# Patient Record
Sex: MALE | Race: WHITE | Employment: FULL TIME | URBAN - METROPOLITAN AREA
[De-identification: names, ages, dates, MRNs, and addresses within clinical notes are randomized per-mention and may not be internally consistent; named-entity substitution may affect disease eponyms.]

---

## 2017-01-17 RX ORDER — SITAGLIPTIN 100 MG/1
TABLET, FILM COATED ORAL
Qty: 30 TABLET | Refills: 5 | Status: SHIPPED | OUTPATIENT
Start: 2017-01-17 | End: 2017-06-23 | Stop reason: SDUPTHER

## 2017-02-02 ENCOUNTER — OFFICE VISIT (OUTPATIENT)
Dept: FAMILY MEDICINE CLINIC | Age: 52
End: 2017-02-02

## 2017-02-02 ENCOUNTER — OFFICE VISIT (OUTPATIENT)
Dept: SURGERY | Age: 52
End: 2017-02-02

## 2017-02-02 VITALS
HEIGHT: 72 IN | HEART RATE: 78 BPM | TEMPERATURE: 96.3 F | RESPIRATION RATE: 12 BRPM | WEIGHT: 269.4 LBS | DIASTOLIC BLOOD PRESSURE: 76 MMHG | BODY MASS INDEX: 36.49 KG/M2 | SYSTOLIC BLOOD PRESSURE: 110 MMHG

## 2017-02-02 VITALS
SYSTOLIC BLOOD PRESSURE: 110 MMHG | WEIGHT: 273 LBS | RESPIRATION RATE: 14 BRPM | HEART RATE: 84 BPM | HEIGHT: 74 IN | BODY MASS INDEX: 35.04 KG/M2 | DIASTOLIC BLOOD PRESSURE: 50 MMHG | TEMPERATURE: 98.9 F

## 2017-02-02 DIAGNOSIS — K61.1 PERIRECTAL ABSCESS: Primary | ICD-10-CM

## 2017-02-02 DIAGNOSIS — K61.1 PERI-RECTAL ABSCESS: Primary | ICD-10-CM

## 2017-02-02 PROCEDURE — 96372 THER/PROPH/DIAG INJ SC/IM: CPT | Performed by: FAMILY MEDICINE

## 2017-02-02 PROCEDURE — 99213 OFFICE O/P EST LOW 20 MIN: CPT | Performed by: FAMILY MEDICINE

## 2017-02-02 PROCEDURE — 99204 OFFICE O/P NEW MOD 45 MIN: CPT | Performed by: SURGERY

## 2017-02-02 RX ORDER — METRONIDAZOLE 500 MG/1
500 TABLET ORAL EVERY 6 HOURS
Qty: 40 TABLET | Refills: 0 | Status: SHIPPED | OUTPATIENT
Start: 2017-02-02 | End: 2017-03-14

## 2017-02-02 RX ORDER — SULFAMETHOXAZOLE AND TRIMETHOPRIM 800; 160 MG/1; MG/1
1 TABLET ORAL 2 TIMES DAILY
Qty: 20 TABLET | Refills: 0 | Status: SHIPPED | OUTPATIENT
Start: 2017-02-02 | End: 2017-02-12

## 2017-02-02 RX ORDER — CEFTRIAXONE 1 G/1
1 INJECTION, POWDER, FOR SOLUTION INTRAMUSCULAR; INTRAVENOUS ONCE
Status: COMPLETED | OUTPATIENT
Start: 2017-02-02 | End: 2017-02-02

## 2017-02-02 RX ADMIN — CEFTRIAXONE 1 G: 1 INJECTION, POWDER, FOR SOLUTION INTRAMUSCULAR; INTRAVENOUS at 09:53

## 2017-02-02 ASSESSMENT — ENCOUNTER SYMPTOMS
CHEST TIGHTNESS: 0
VOMITING: 0
ABDOMINAL DISTENTION: 0
COLOR CHANGE: 0
SHORTNESS OF BREATH: 0
EYE PAIN: 0
EYE DISCHARGE: 0
WHEEZING: 0
TROUBLE SWALLOWING: 0
CHOKING: 0
ANAL BLEEDING: 0
BACK PAIN: 0
ABDOMINAL PAIN: 0

## 2017-02-13 ENCOUNTER — OFFICE VISIT (OUTPATIENT)
Dept: SURGERY | Age: 52
End: 2017-02-13

## 2017-02-13 VITALS
WEIGHT: 280 LBS | TEMPERATURE: 98.4 F | SYSTOLIC BLOOD PRESSURE: 140 MMHG | DIASTOLIC BLOOD PRESSURE: 80 MMHG | BODY MASS INDEX: 35.95 KG/M2

## 2017-02-13 DIAGNOSIS — Z12.11 COLON CANCER SCREENING: ICD-10-CM

## 2017-02-13 DIAGNOSIS — K61.1 PERI-RECTAL ABSCESS: Primary | ICD-10-CM

## 2017-02-13 PROCEDURE — 99213 OFFICE O/P EST LOW 20 MIN: CPT | Performed by: SURGERY

## 2017-02-13 ASSESSMENT — ENCOUNTER SYMPTOMS
ABDOMINAL DISTENTION: 0
VOMITING: 0
CHEST TIGHTNESS: 0
WHEEZING: 0
ABDOMINAL PAIN: 0
CHOKING: 0
SHORTNESS OF BREATH: 0
ANAL BLEEDING: 0
BACK PAIN: 0
EYE DISCHARGE: 0
TROUBLE SWALLOWING: 0
EYE PAIN: 0
COLOR CHANGE: 0

## 2017-02-16 RX ORDER — SODIUM, POTASSIUM,MAG SULFATES 17.5-3.13G
354 SOLUTION, RECONSTITUTED, ORAL ORAL SEE ADMIN INSTRUCTIONS
Qty: 1 BOTTLE | Refills: 0 | Status: SHIPPED | OUTPATIENT
Start: 2017-02-16 | End: 2017-03-20 | Stop reason: ALTCHOICE

## 2017-03-01 RX ORDER — SODIUM, POTASSIUM,MAG SULFATES 17.5-3.13G
354 SOLUTION, RECONSTITUTED, ORAL ORAL SEE ADMIN INSTRUCTIONS
Qty: 1 BOTTLE | Refills: 0 | Status: SHIPPED | OUTPATIENT
Start: 2017-03-01 | End: 2017-03-20 | Stop reason: ALTCHOICE

## 2017-03-20 ENCOUNTER — OFFICE VISIT (OUTPATIENT)
Dept: FAMILY MEDICINE CLINIC | Age: 52
End: 2017-03-20

## 2017-03-20 VITALS
BODY MASS INDEX: 35.96 KG/M2 | HEART RATE: 66 BPM | WEIGHT: 280.2 LBS | DIASTOLIC BLOOD PRESSURE: 84 MMHG | HEIGHT: 74 IN | SYSTOLIC BLOOD PRESSURE: 130 MMHG | TEMPERATURE: 96.4 F | RESPIRATION RATE: 12 BRPM

## 2017-03-20 DIAGNOSIS — I10 ESSENTIAL HYPERTENSION: ICD-10-CM

## 2017-03-20 DIAGNOSIS — E11.21 DIABETES MELLITUS WITH MICROALBUMINURIC DIABETIC NEPHROPATHY (HCC): ICD-10-CM

## 2017-03-20 DIAGNOSIS — E11.9 TYPE 2 DIABETES MELLITUS WITHOUT COMPLICATION, WITHOUT LONG-TERM CURRENT USE OF INSULIN (HCC): ICD-10-CM

## 2017-03-20 DIAGNOSIS — E66.01 MORBID OBESITY DUE TO EXCESS CALORIES (HCC): ICD-10-CM

## 2017-03-20 DIAGNOSIS — Z12.5 SCREENING PSA (PROSTATE SPECIFIC ANTIGEN): ICD-10-CM

## 2017-03-20 DIAGNOSIS — E78.1 HYPERTRIGLYCERIDEMIA: ICD-10-CM

## 2017-03-20 DIAGNOSIS — I10 ESSENTIAL HYPERTENSION: Primary | ICD-10-CM

## 2017-03-20 DIAGNOSIS — S46.912A LEFT SHOULDER STRAIN, INITIAL ENCOUNTER: ICD-10-CM

## 2017-03-20 LAB
ALBUMIN SERPL-MCNC: 4.4 G/DL (ref 3.9–4.9)
ALP BLD-CCNC: 97 U/L (ref 35–104)
ALT SERPL-CCNC: 23 U/L (ref 0–41)
ANION GAP SERPL CALCULATED.3IONS-SCNC: 13 MEQ/L (ref 7–13)
AST SERPL-CCNC: 15 U/L (ref 0–40)
BASOPHILS ABSOLUTE: 0.1 K/UL (ref 0–0.2)
BASOPHILS RELATIVE PERCENT: 0.4 %
BILIRUB SERPL-MCNC: 0.3 MG/DL (ref 0–1.2)
BUN BLDV-MCNC: 14 MG/DL (ref 6–20)
CALCIUM SERPL-MCNC: 9.9 MG/DL (ref 8.6–10.2)
CHLORIDE BLD-SCNC: 99 MEQ/L (ref 98–107)
CHOLESTEROL, TOTAL: 174 MG/DL (ref 0–199)
CO2: 24 MEQ/L (ref 22–29)
CREAT SERPL-MCNC: 0.68 MG/DL (ref 0.7–1.2)
EOSINOPHILS ABSOLUTE: 0.4 K/UL (ref 0–0.7)
EOSINOPHILS RELATIVE PERCENT: 3 %
GFR AFRICAN AMERICAN: >60
GFR NON-AFRICAN AMERICAN: >60
GLOBULIN: 2.6 G/DL (ref 2.3–3.5)
GLUCOSE BLD-MCNC: 146 MG/DL (ref 74–109)
HBA1C MFR BLD: 7.1 % (ref 4.8–5.9)
HCT VFR BLD CALC: 46.9 % (ref 42–52)
HDLC SERPL-MCNC: 34 MG/DL (ref 40–59)
HEMOGLOBIN: 15.8 G/DL (ref 14–18)
LDL CHOLESTEROL CALCULATED: ABNORMAL MG/DL (ref 0–129)
LYMPHOCYTES ABSOLUTE: 3 K/UL (ref 1–4.8)
LYMPHOCYTES RELATIVE PERCENT: 23.5 %
MCH RBC QN AUTO: 30.3 PG (ref 27–31.3)
MCHC RBC AUTO-ENTMCNC: 33.7 % (ref 33–37)
MCV RBC AUTO: 89.9 FL (ref 80–100)
MONOCYTES ABSOLUTE: 0.8 K/UL (ref 0.2–0.8)
MONOCYTES RELATIVE PERCENT: 6.5 %
NEUTROPHILS ABSOLUTE: 8.5 K/UL (ref 1.4–6.5)
NEUTROPHILS RELATIVE PERCENT: 66.6 %
PDW BLD-RTO: 14.5 % (ref 11.5–14.5)
PLATELET # BLD: 270 K/UL (ref 130–400)
POTASSIUM SERPL-SCNC: 4.8 MEQ/L (ref 3.5–5.1)
PROSTATE SPECIFIC ANTIGEN: 0.31 NG/ML (ref 0–3.89)
RBC # BLD: 5.22 M/UL (ref 4.7–6.1)
SODIUM BLD-SCNC: 136 MEQ/L (ref 132–144)
TOTAL PROTEIN: 7 G/DL (ref 6.4–8.1)
TRIGL SERPL-MCNC: 414 MG/DL (ref 0–200)
WBC # BLD: 12.8 K/UL (ref 4.8–10.8)

## 2017-03-20 PROCEDURE — 99214 OFFICE O/P EST MOD 30 MIN: CPT | Performed by: FAMILY MEDICINE

## 2017-03-20 ASSESSMENT — PATIENT HEALTH QUESTIONNAIRE - PHQ9
SUM OF ALL RESPONSES TO PHQ9 QUESTIONS 1 & 2: 1
SUM OF ALL RESPONSES TO PHQ QUESTIONS 1-9: 1
1. LITTLE INTEREST OR PLEASURE IN DOING THINGS: 0
2. FEELING DOWN, DEPRESSED OR HOPELESS: 1

## 2017-04-28 PROBLEM — K64.4 EXTERNAL HEMORRHOID: Status: ACTIVE | Noted: 2017-04-28

## 2017-05-10 RX ORDER — COLCHICINE 0.6 MG/1
TABLET ORAL
Qty: 60 TABLET | Refills: 2 | Status: SHIPPED | OUTPATIENT
Start: 2017-05-10 | End: 2018-07-30 | Stop reason: SDUPTHER

## 2017-06-23 ENCOUNTER — OFFICE VISIT (OUTPATIENT)
Dept: FAMILY MEDICINE CLINIC | Age: 52
End: 2017-06-23

## 2017-06-23 VITALS
BODY MASS INDEX: 34.93 KG/M2 | TEMPERATURE: 96.5 F | WEIGHT: 272.2 LBS | SYSTOLIC BLOOD PRESSURE: 112 MMHG | RESPIRATION RATE: 18 BRPM | HEART RATE: 66 BPM | DIASTOLIC BLOOD PRESSURE: 66 MMHG | HEIGHT: 74 IN

## 2017-06-23 DIAGNOSIS — E78.1 HYPERTRIGLYCERIDEMIA: ICD-10-CM

## 2017-06-23 DIAGNOSIS — I10 ESSENTIAL HYPERTENSION: ICD-10-CM

## 2017-06-23 DIAGNOSIS — Z00.8 ENCOUNTER FOR PSYCHOLOGICAL EVALUATION: ICD-10-CM

## 2017-06-23 DIAGNOSIS — E11.21 DIABETES MELLITUS WITH MICROALBUMINURIC DIABETIC NEPHROPATHY (HCC): ICD-10-CM

## 2017-06-23 DIAGNOSIS — L23.7 POISON IVY DERMATITIS: ICD-10-CM

## 2017-06-23 DIAGNOSIS — E66.01 MORBID OBESITY DUE TO EXCESS CALORIES (HCC): ICD-10-CM

## 2017-06-23 DIAGNOSIS — M1A.9XX0 CHRONIC GOUT WITHOUT TOPHUS, UNSPECIFIED CAUSE, UNSPECIFIED SITE: ICD-10-CM

## 2017-06-23 DIAGNOSIS — E11.9 TYPE 2 DIABETES MELLITUS WITHOUT COMPLICATION, WITHOUT LONG-TERM CURRENT USE OF INSULIN (HCC): ICD-10-CM

## 2017-06-23 DIAGNOSIS — E11.21 DIABETES MELLITUS WITH MICROALBUMINURIC DIABETIC NEPHROPATHY (HCC): Primary | ICD-10-CM

## 2017-06-23 LAB
HBA1C MFR BLD: 7.5 % (ref 4.8–5.9)
URIC ACID, SERUM: 7.9 MG/DL (ref 3.4–7)

## 2017-06-23 PROCEDURE — 99214 OFFICE O/P EST MOD 30 MIN: CPT | Performed by: FAMILY MEDICINE

## 2017-06-23 RX ORDER — FEBUXOSTAT 40 MG/1
40 TABLET, FILM COATED ORAL DAILY
Qty: 30 TABLET | Refills: 11 | Status: SHIPPED | OUTPATIENT
Start: 2017-06-23 | End: 2017-06-24 | Stop reason: SDUPTHER

## 2017-06-24 RX ORDER — FEBUXOSTAT 80 MG/1
80 TABLET, FILM COATED ORAL DAILY
Qty: 30 TABLET | Refills: 11 | Status: SHIPPED | OUTPATIENT
Start: 2017-06-24 | End: 2018-01-12 | Stop reason: ALTCHOICE

## 2017-07-03 DIAGNOSIS — E11.9 TYPE 2 DIABETES MELLITUS WITHOUT COMPLICATION, UNSPECIFIED LONG TERM INSULIN USE STATUS: ICD-10-CM

## 2017-09-20 LAB — PATHOLOGY REPORT: NORMAL

## 2017-09-22 ENCOUNTER — HOSPITAL ENCOUNTER (OUTPATIENT)
Dept: CT IMAGING | Age: 52
Discharge: HOME | End: 2017-09-22
Payer: COMMERCIAL

## 2017-09-22 VITALS
RESPIRATION RATE: 16 BRPM | WEIGHT: 275 LBS | HEIGHT: 74 IN | DIASTOLIC BLOOD PRESSURE: 75 MMHG | BODY MASS INDEX: 35.29 KG/M2 | SYSTOLIC BLOOD PRESSURE: 138 MMHG | HEART RATE: 54 BPM

## 2017-09-22 DIAGNOSIS — M51.26 RUPTURED LUMBAR DISC: ICD-10-CM

## 2017-09-22 DIAGNOSIS — M96.1 POSTLAMINECTOMY SYNDROME, UNSPECIFIED REGION: ICD-10-CM

## 2017-09-22 PROCEDURE — 72131 CT LUMBAR SPINE W/O DYE: CPT

## 2017-10-17 DIAGNOSIS — I10 ESSENTIAL HYPERTENSION: ICD-10-CM

## 2017-10-17 RX ORDER — LISINOPRIL 10 MG/1
TABLET ORAL
Qty: 90 TABLET | Refills: 3 | Status: SHIPPED | OUTPATIENT
Start: 2017-10-17 | End: 2018-01-12 | Stop reason: SDUPTHER

## 2017-10-17 NOTE — TELEPHONE ENCOUNTER
Pharmacy REQUESTING REFILL. ORDER PENDED.  [address]    LOV-6/23/2017    Future Appointments  Date Time Provider [place]   11/17/2017 1:30 PM Justin Klein  [address]   12/22/2017 8:45 AM MD Cathryn Burciaga PCP [facility] [place]

## 2018-01-12 ENCOUNTER — OFFICE VISIT (OUTPATIENT)
Dept: FAMILY MEDICINE CLINIC | Age: 53
End: 2018-01-12

## 2018-01-12 ENCOUNTER — TELEPHONE (OUTPATIENT)
Dept: FAMILY MEDICINE CLINIC | Age: 53
End: 2018-01-12

## 2018-01-12 VITALS
WEIGHT: 280.4 LBS | HEART RATE: 81 BPM | SYSTOLIC BLOOD PRESSURE: 134 MMHG | TEMPERATURE: 98 F | OXYGEN SATURATION: 98 % | BODY MASS INDEX: 35.99 KG/M2 | DIASTOLIC BLOOD PRESSURE: 88 MMHG | HEIGHT: 74 IN

## 2018-01-12 DIAGNOSIS — I10 ESSENTIAL HYPERTENSION: ICD-10-CM

## 2018-01-12 DIAGNOSIS — E11.9 TYPE 2 DIABETES MELLITUS WITHOUT COMPLICATION, UNSPECIFIED LONG TERM INSULIN USE STATUS: Primary | ICD-10-CM

## 2018-01-12 DIAGNOSIS — E66.01 OBESITY, MORBID (HCC): ICD-10-CM

## 2018-01-12 PROCEDURE — 99214 OFFICE O/P EST MOD 30 MIN: CPT | Performed by: FAMILY MEDICINE

## 2018-01-12 RX ORDER — LISINOPRIL 10 MG/1
TABLET ORAL
Qty: 30 TABLET | Refills: 11 | Status: SHIPPED | OUTPATIENT
Start: 2018-01-12 | End: 2018-01-12 | Stop reason: SDUPTHER

## 2018-01-12 RX ORDER — LISINOPRIL 20 MG/1
TABLET ORAL
Qty: 30 TABLET | Refills: 11 | Status: SHIPPED | OUTPATIENT
Start: 2018-01-12 | End: 2018-12-16 | Stop reason: SDUPTHER

## 2018-01-12 RX ORDER — FEBUXOSTAT 80 MG/1
80 TABLET, FILM COATED ORAL DAILY
Qty: 30 TABLET | Refills: 11 | Status: SHIPPED
Start: 2018-01-12

## 2018-01-12 NOTE — PROGRESS NOTES
Lymphatic ROS: negative  Respiratory ROS: no cough, shortness of breath, or wheezing  Cardiovascular ROS: no chest pain or dyspnea on exertion  Gastrointestinal ROS: no abdominal pain, change in bowel habits, or black or bloody stools  Genito-Urinary ROS: no dysuria, trouble voiding, or hematuria  Musculoskeletal ROS: positive for - pain in generalized back  Neurological ROS: positive for - numbness/tingling  Dermatological ROS: negative    Vitals:    01/12/18 0855   BP: 134/88   Site: Left Arm   Position: Sitting   Cuff Size: Medium Adult   Pulse: 81   Temp: 98 °F (36.7 °C)   TempSrc: Temporal   SpO2: 98%   Weight: 280 lb 6.4 oz (127.2 kg)   Height: 6' 2\" (1.88 m)     BP on recheck 142/92. Physical Examination: General appearance - alert, well appearing, obese and in no distress. Skin - normal coloration and turgor, no rashes, no suspicious skin lesions noted. Ears - bilateral TM's and external ear canals normal  Nose - normal and patent, no erythema, discharge or polyps and mucosal congestion  Mouth - mucous membranes moist, pharynx normal without lesions  Neck - supple, no significant adenopathy, carotids upstroke normal bilaterally, no bruits, thyroid exam: thyroid is normal in size without nodules or tenderness  Chest - clear to auscultation, no wheezes, rales or rhonchi, symmetric air entry  Heart - normal rate, regular rhythm, normal S1, S2, no murmurs, rubs, clicks or gallops. Abdomen - soft, nondistended, nontender. No masses or organomegaly noted. Bowel sounds are positive. Extremity - no edema. Dorsalis pedis and posterior tibial pulses are symmetric. Assessment:   1. Type 2 diabetes mellitus without complication, unspecified long term insulin use status (HCC)  metFORMIN (GLUCOPHAGE) 500 MG tablet   2. Essential hypertension  lisinopril (PRINIVIL;ZESTRIL) 20 MG tablet    DISCONTINUED: lisinopril (PRINIVIL;ZESTRIL) 10 MG tablet   3.  Obesity, morbid ([address]       Plan:    Orders Placed This

## 2018-07-30 ENCOUNTER — OFFICE VISIT (OUTPATIENT)
Dept: FAMILY MEDICINE CLINIC | Age: 53
End: 2018-07-30
Payer: COMMERCIAL

## 2018-07-30 VITALS
WEIGHT: 264.2 LBS | TEMPERATURE: 98.3 F | SYSTOLIC BLOOD PRESSURE: 118 MMHG | BODY MASS INDEX: 35.02 KG/M2 | HEART RATE: 80 BPM | OXYGEN SATURATION: 96 % | HEIGHT: 73 IN | DIASTOLIC BLOOD PRESSURE: 62 MMHG

## 2018-07-30 DIAGNOSIS — E66.01 OBESITY, MORBID (HCC): ICD-10-CM

## 2018-07-30 DIAGNOSIS — E78.1 HYPERTRIGLYCERIDEMIA: ICD-10-CM

## 2018-07-30 DIAGNOSIS — I10 ESSENTIAL HYPERTENSION: ICD-10-CM

## 2018-07-30 DIAGNOSIS — Z12.5 SCREENING PSA (PROSTATE SPECIFIC ANTIGEN): ICD-10-CM

## 2018-07-30 DIAGNOSIS — E11.9 TYPE 2 DIABETES MELLITUS WITHOUT COMPLICATION, UNSPECIFIED LONG TERM INSULIN USE STATUS: Primary | ICD-10-CM

## 2018-07-30 DIAGNOSIS — Z13.31 POSITIVE DEPRESSION SCREENING: ICD-10-CM

## 2018-07-30 PROCEDURE — G8417 CALC BMI ABV UP PARAM F/U: HCPCS | Performed by: FAMILY MEDICINE

## 2018-07-30 PROCEDURE — 2022F DILAT RTA XM EVC RTNOPTHY: CPT | Performed by: FAMILY MEDICINE

## 2018-07-30 PROCEDURE — 4004F PT TOBACCO SCREEN RCVD TLK: CPT | Performed by: FAMILY MEDICINE

## 2018-07-30 PROCEDURE — 3017F COLORECTAL CA SCREEN DOC REV: CPT | Performed by: FAMILY MEDICINE

## 2018-07-30 PROCEDURE — 99214 OFFICE O/P EST MOD 30 MIN: CPT | Performed by: FAMILY MEDICINE

## 2018-07-30 PROCEDURE — G8431 POS CLIN DEPRES SCRN F/U DOC: HCPCS | Performed by: FAMILY MEDICINE

## 2018-07-30 PROCEDURE — G8427 DOCREV CUR MEDS BY ELIG CLIN: HCPCS | Performed by: FAMILY MEDICINE

## 2018-07-30 PROCEDURE — 96160 PT-FOCUSED HLTH RISK ASSMT: CPT | Performed by: FAMILY MEDICINE

## 2018-07-30 PROCEDURE — 3046F HEMOGLOBIN A1C LEVEL >9.0%: CPT | Performed by: FAMILY MEDICINE

## 2018-07-30 RX ORDER — LANCETS 28 GAUGE
1 EACH MISCELLANEOUS DAILY
Qty: 100 EACH | Refills: 3 | Status: SHIPPED | OUTPATIENT
Start: 2018-07-30

## 2018-07-30 RX ORDER — COLCHICINE 0.6 MG/1
TABLET ORAL
Qty: 60 TABLET | Refills: 2 | Status: SHIPPED | OUTPATIENT
Start: 2018-07-30 | End: 2018-11-12 | Stop reason: SDUPTHER

## 2018-07-30 ASSESSMENT — PATIENT HEALTH QUESTIONNAIRE - PHQ9
2. FEELING DOWN, DEPRESSED OR HOPELESS: 2
10. IF YOU CHECKED OFF ANY PROBLEMS, HOW DIFFICULT HAVE THESE PROBLEMS MADE IT FOR YOU TO DO YOUR WORK, TAKE CARE OF THINGS AT HOME, OR GET ALONG WITH OTHER PEOPLE: 2
9. THOUGHTS THAT YOU WOULD BE BETTER OFF DEAD, OR OF HURTING YOURSELF: 0
5. POOR APPETITE OR OVEREATING: 1
6. FEELING BAD ABOUT YOURSELF - OR THAT YOU ARE A FAILURE OR HAVE LET YOURSELF OR YOUR FAMILY DOWN: 0
SUM OF ALL RESPONSES TO PHQ9 QUESTIONS 1 & 2: 4
4. FEELING TIRED OR HAVING LITTLE ENERGY: 1
7. TROUBLE CONCENTRATING ON THINGS, SUCH AS READING THE NEWSPAPER OR WATCHING TELEVISION: 0
1. LITTLE INTEREST OR PLEASURE IN DOING THINGS: 2
SUM OF ALL RESPONSES TO PHQ QUESTIONS 1-9: 8
8. MOVING OR SPEAKING SO SLOWLY THAT OTHER PEOPLE COULD HAVE NOTICED. OR THE OPPOSITE, BEING SO FIGETY OR RESTLESS THAT YOU HAVE BEEN MOVING AROUND A LOT MORE THAN USUAL: 0
3. TROUBLE FALLING OR STAYING ASLEEP: 2

## 2018-07-30 NOTE — PROGRESS NOTES
Chief Complaint   Patient presents with    Follow-up     LOV 01/12/2018    Diabetes Mellitus     Type 2    Hypertension    Obesity    Discuss Medications     HPI: Bennett Ruffin is a 46 y.o. male presenting for follow-up of DM Type 2, HTN, and Obesity. I last saw the patient 01/12/2018. He is working and has Sunday and Monday off. He is not having a good experience there. His blood sugar was 120 yesterday. He was off of his medication for a period of time at his own discretion. However, when his blood sugars did elevate into the upper 200/300 range, he went back on his medications. Past Medical History:   Diagnosis Date    Gout     Hypertension     Hypertriglyceridemia     Obesity, morbid ([address]     Obstructive sleep apnea      Past Surgical History:   Procedure Laterality Date    BACK SURGERY      LUMBAR 10/2009    SPINE SURGERY  4-12-12    neurospine stimulator    TONSILLECTOMY AND ADENOIDECTOMY      1969       family history includes Cancer in his paternal grandfather; Heart Disease in his maternal grandmother and mother; High Blood Pressure in his maternal grandmother. Social History     Social History    Marital status:      Spouse name: N/A    Number of children: N/A    Years of education: N/A     Occupational History    Not on file.      Social History Main Topics    Smoking status: Current Every Day Smoker     Packs/day: 1.50     Years: 32.00     Types: Cigarettes    Smokeless tobacco: Never Used    Alcohol use Yes      Comment: OCCASIONAL     Drug use: No    Sexual activity: Not on file     Other Topics Concern    Not on file     Social History Narrative    No narrative on file       Allergies   Allergen Reactions    Demerol Rash and Other (See Comments)     FEVER    Demerol Hcl [Meperidine] Rash     Review of Systems - General ROS: positive for  - fatigue  Psychological ROS: negative  ENT ROS: negative  Hematological and Lymphatic ROS: negative  Respiratory ROS: no cough, shortness of breath, or wheezing  Cardiovascular ROS: no chest pain or dyspnea on exertion  Gastrointestinal ROS: no abdominal pain, change in bowel habits, or black or bloody stools  Genito-Urinary ROS: no dysuria, trouble voiding, or hematuria  Musculoskeletal ROS: negative  Neurological ROS: tingling/numbness, dizziness  Dermatological ROS: rash on left foot    Vitals:    07/30/18 1331 07/30/18 1402   BP: 106/70 118/62   Site: Left Arm    Position: Sitting    Cuff Size: Medium Adult    Pulse: 80    Temp: 98.3 °F (36.8 °C)    TempSrc: Temporal    SpO2: 96%    Weight: 264 lb 3.2 oz (119.8 kg)    Height: 6' 0.87\" (1.851 m)      Physical Examination: General appearance - alert, well appearing, obese and in no distress. Skin - normal coloration and turgor, no rashes, no suspicious skin lesions noted. Ears - bilateral TM's and external ear canals normal  Nose - normal and patent, no erythema, discharge or polyps and mucosal congestion  Mouth - mucous membranes moist, pharynx normal without lesions  Neck - supple, no significant adenopathy, carotids upstroke normal bilaterally, no bruits, thyroid exam: thyroid is normal in size without nodules or tenderness  Chest - clear to auscultation, no wheezes, rales or rhonchi, symmetric air entry  Heart - normal rate, regular rhythm, normal S1, S2, no murmurs, rubs, clicks or gallops. Abdomen - soft, nondistended, nontender. No masses or organomegaly noted. Bowel sounds are positive. Extremity - no edema. Dorsalis pedis and posterior tibial pulses are symmetric. No fissures between the toes. No open sores on the feet and no foot deformities noted. Mild sensory deficits are detected. Monofilament testing is abnormal.  Left foot is worse than the right foot. Assessment:    Diagnosis Orders   1.  Type 2 diabetes mellitus without complication, unspecified long term insulin use status (HCC)  metFORMIN (GLUCOPHAGE) 500 MG tablet    Hemoglobin A1C    [facility] DIABETES FOOT

## 2018-07-31 ENCOUNTER — TELEPHONE (OUTPATIENT)
Dept: FAMILY MEDICINE CLINIC | Age: 53
End: 2018-07-31

## 2018-07-31 RX ORDER — ALOGLIPTIN 25 MG/1
25 TABLET, FILM COATED ORAL DAILY
Qty: 30 TABLET | Refills: 11 | Status: SHIPPED | OUTPATIENT
Start: 2018-07-31

## 2018-08-13 DIAGNOSIS — Z12.5 SCREENING PSA (PROSTATE SPECIFIC ANTIGEN): ICD-10-CM

## 2018-08-13 DIAGNOSIS — I10 ESSENTIAL HYPERTENSION: ICD-10-CM

## 2018-08-13 DIAGNOSIS — E78.1 HYPERTRIGLYCERIDEMIA: ICD-10-CM

## 2018-08-13 LAB
ALBUMIN SERPL-MCNC: 4.4 G/DL (ref 3.9–4.9)
ALP BLD-CCNC: 72 U/L (ref 35–104)
ALT SERPL-CCNC: 19 U/L (ref 0–41)
ANION GAP SERPL CALCULATED.3IONS-SCNC: 16 MEQ/L (ref 7–13)
AST SERPL-CCNC: 19 U/L (ref 0–40)
BASOPHILS ABSOLUTE: 0.1 K/UL (ref 0–0.2)
BASOPHILS RELATIVE PERCENT: 0.5 %
BILIRUB SERPL-MCNC: <0.2 MG/DL (ref 0–1.2)
BUN BLDV-MCNC: 15 MG/DL (ref 6–20)
CALCIUM SERPL-MCNC: 9.3 MG/DL (ref 8.6–10.2)
CHLORIDE BLD-SCNC: 102 MEQ/L (ref 98–107)
CHOLESTEROL, TOTAL: 218 MG/DL (ref 0–199)
CO2: 22 MEQ/L (ref 22–29)
CREAT SERPL-MCNC: 0.68 MG/DL (ref 0.7–1.2)
EOSINOPHILS ABSOLUTE: 0.3 K/UL (ref 0–0.7)
EOSINOPHILS RELATIVE PERCENT: 2.6 %
GFR AFRICAN AMERICAN: >60
GFR NON-AFRICAN AMERICAN: >60
GLOBULIN: 2.7 G/DL (ref 2.3–3.5)
GLUCOSE BLD-MCNC: 102 MG/DL (ref 74–109)
HCT VFR BLD CALC: 46.5 % (ref 42–52)
HDLC SERPL-MCNC: 31 MG/DL (ref 40–59)
HEMOGLOBIN: 15.6 G/DL (ref 14–18)
LDL CHOLESTEROL CALCULATED: 144 MG/DL (ref 0–129)
LYMPHOCYTES ABSOLUTE: 2.2 K/UL (ref 1–4.8)
LYMPHOCYTES RELATIVE PERCENT: 20.7 %
MCH RBC QN AUTO: 30.8 PG (ref 27–31.3)
MCHC RBC AUTO-ENTMCNC: 33.6 % (ref 33–37)
MCV RBC AUTO: 91.7 FL (ref 80–100)
MONOCYTES ABSOLUTE: 0.7 K/UL (ref 0.2–0.8)
MONOCYTES RELATIVE PERCENT: 6.1 %
NEUTROPHILS ABSOLUTE: 7.5 K/UL (ref 1.4–6.5)
NEUTROPHILS RELATIVE PERCENT: 70.1 %
PDW BLD-RTO: 14.4 % (ref 11.5–14.5)
PLATELET # BLD: 285 K/UL (ref 130–400)
POTASSIUM SERPL-SCNC: 4.9 MEQ/L (ref 3.5–5.1)
PROSTATE SPECIFIC ANTIGEN: 0.39 NG/ML (ref 0–3.89)
RBC # BLD: 5.07 M/UL (ref 4.7–6.1)
SODIUM BLD-SCNC: 140 MEQ/L (ref 132–144)
TOTAL PROTEIN: 7.1 G/DL (ref 6.4–8.1)
TRIGL SERPL-MCNC: 216 MG/DL (ref 0–200)
WBC # BLD: 10.7 K/UL (ref 4.8–10.8)

## 2018-08-14 ENCOUNTER — TELEPHONE (OUTPATIENT)
Dept: FAMILY MEDICINE CLINIC | Age: 53
End: 2018-08-14

## 2018-08-14 DIAGNOSIS — E78.1 HYPERTRIGLYCERIDEMIA: Primary | ICD-10-CM

## 2018-08-14 RX ORDER — ATORVASTATIN CALCIUM 20 MG/1
TABLET, FILM COATED ORAL
Qty: 30 TABLET | Refills: 11 | Status: SHIPPED | OUTPATIENT
Start: 2018-08-14 | End: 2018-10-26 | Stop reason: SDUPTHER

## 2018-08-14 NOTE — TELEPHONE ENCOUNTER
Yes, patient should be taking atorvastatin 20 mg by mouth daily at bedtime. Prescription was sent to pharmacy of record.

## 2018-08-14 NOTE — TELEPHONE ENCOUNTER
The pharmacy called in regards to the patients atorvastatin. The patient is unsure if he is to be on this, and the pharmacy is unsure as well. It is listed in the patients chart but has not been ordered since 2016 (with 11 refills). I did not see anything about it in his OV notes. Is patient to be on this? If so, the pharmacy will need a new prescription. Please advise.

## 2018-10-26 ENCOUNTER — OFFICE VISIT (OUTPATIENT)
Dept: FAMILY MEDICINE CLINIC | Age: 53
End: 2018-10-26
Payer: COMMERCIAL

## 2018-10-26 VITALS
HEART RATE: 66 BPM | TEMPERATURE: 97.3 F | BODY MASS INDEX: 33.75 KG/M2 | SYSTOLIC BLOOD PRESSURE: 120 MMHG | HEIGHT: 74 IN | WEIGHT: 263 LBS | RESPIRATION RATE: 20 BRPM | DIASTOLIC BLOOD PRESSURE: 68 MMHG

## 2018-10-26 DIAGNOSIS — E78.1 HYPERTRIGLYCERIDEMIA: ICD-10-CM

## 2018-10-26 DIAGNOSIS — I10 ESSENTIAL HYPERTENSION: ICD-10-CM

## 2018-10-26 DIAGNOSIS — Z23 NEEDS FLU SHOT: ICD-10-CM

## 2018-10-26 DIAGNOSIS — M25.461 EFFUSION OF BURSA OF RIGHT KNEE: ICD-10-CM

## 2018-10-26 DIAGNOSIS — E66.01 OBESITY, MORBID (HCC): ICD-10-CM

## 2018-10-26 DIAGNOSIS — M25.461 EFFUSION OF BURSA OF RIGHT KNEE: Primary | ICD-10-CM

## 2018-10-26 DIAGNOSIS — E11.9 TYPE 2 DIABETES MELLITUS WITHOUT COMPLICATION, WITHOUT LONG-TERM CURRENT USE OF INSULIN (HCC): ICD-10-CM

## 2018-10-26 PROCEDURE — 2022F DILAT RTA XM EVC RTNOPTHY: CPT | Performed by: FAMILY MEDICINE

## 2018-10-26 PROCEDURE — 99214 OFFICE O/P EST MOD 30 MIN: CPT | Performed by: FAMILY MEDICINE

## 2018-10-26 PROCEDURE — 4004F PT TOBACCO SCREEN RCVD TLK: CPT | Performed by: FAMILY MEDICINE

## 2018-10-26 PROCEDURE — 20610 DRAIN/INJ JOINT/BURSA W/O US: CPT | Performed by: FAMILY MEDICINE

## 2018-10-26 PROCEDURE — 3017F COLORECTAL CA SCREEN DOC REV: CPT | Performed by: FAMILY MEDICINE

## 2018-10-26 PROCEDURE — G8427 DOCREV CUR MEDS BY ELIG CLIN: HCPCS | Performed by: FAMILY MEDICINE

## 2018-10-26 PROCEDURE — 3046F HEMOGLOBIN A1C LEVEL >9.0%: CPT | Performed by: FAMILY MEDICINE

## 2018-10-26 PROCEDURE — G8417 CALC BMI ABV UP PARAM F/U: HCPCS | Performed by: FAMILY MEDICINE

## 2018-10-26 PROCEDURE — 90688 IIV4 VACCINE SPLT 0.5 ML IM: CPT | Performed by: FAMILY MEDICINE

## 2018-10-26 PROCEDURE — 90471 IMMUNIZATION ADMIN: CPT | Performed by: FAMILY MEDICINE

## 2018-10-26 PROCEDURE — G8482 FLU IMMUNIZE ORDER/ADMIN: HCPCS | Performed by: FAMILY MEDICINE

## 2018-10-26 RX ORDER — LIDOCAINE HYDROCHLORIDE 10 MG/ML
2 INJECTION, SOLUTION INFILTRATION; PERINEURAL ONCE
Status: COMPLETED | OUTPATIENT
Start: 2018-10-26 | End: 2018-10-26

## 2018-10-26 RX ORDER — METHYLPREDNISOLONE ACETATE 40 MG/ML
40 INJECTION, SUSPENSION INTRA-ARTICULAR; INTRALESIONAL; INTRAMUSCULAR; SOFT TISSUE ONCE
Status: COMPLETED | OUTPATIENT
Start: 2018-10-26 | End: 2018-10-26

## 2018-10-26 RX ORDER — ICOSAPENT ETHYL 1000 MG/1
CAPSULE ORAL
Qty: 120 CAPSULE | Refills: 11 | Status: SHIPPED | OUTPATIENT
Start: 2018-10-26

## 2018-10-26 RX ORDER — ATORVASTATIN CALCIUM 40 MG/1
TABLET, FILM COATED ORAL
Qty: 30 TABLET | Refills: 11 | Status: SHIPPED | OUTPATIENT
Start: 2018-10-26

## 2018-10-26 RX ADMIN — METHYLPREDNISOLONE ACETATE 40 MG: 40 INJECTION, SUSPENSION INTRA-ARTICULAR; INTRALESIONAL; INTRAMUSCULAR; SOFT TISSUE at 13:01

## 2018-10-26 RX ADMIN — LIDOCAINE HYDROCHLORIDE 2 ML: 10 INJECTION, SOLUTION INFILTRATION; PERINEURAL at 12:56

## 2018-10-26 ASSESSMENT — ENCOUNTER SYMPTOMS
CHEST TIGHTNESS: 0
SINUS PAIN: 0
TROUBLE SWALLOWING: 0
VOMITING: 0
VOICE CHANGE: 0
DIARRHEA: 0
COLOR CHANGE: 0
WHEEZING: 0
ABDOMINAL PAIN: 0
CONSTIPATION: 0
SORE THROAT: 0
COUGH: 1
NAUSEA: 0
SHORTNESS OF BREATH: 0
BLOOD IN STOOL: 0
RHINORRHEA: 0

## 2018-10-26 NOTE — PROGRESS NOTES
Chief Complaint   Patient presents with    Knee Pain     right knee    Joint Swelling     HPI: Roz Townsend is a 46 y.o. male presenting for follow-up of right knee pain and swelling. I last saw the patient 3 months ago. He denies any injuries. He is not sure what is going on with it. He has been working on uneven ground, cement and doing a lot of stairs. He denies any clicking or locking of the knee. Patient is also here for follow-up on his diabetes and hyperlipidemia. He has not been checking his blood sugars. He did have lab work and would like to review those results. He is taking his atorvastatin but is not taking the Vascepa as listed. Current Outpatient Prescriptions on File Prior to Visit   Medication Sig Dispense Refill    alogliptin (NESINA) 25 MG TABS tablet Take 1 tablet by mouth daily 30 tablet 11    colchicine (COLCRYS) 0.6 MG tablet TAKE 1 TABLET BY MOUTH TWICE DAILY WITH FOOD AS NEEDED. 60 tablet 2    FREESTYLE LANCETS MISC 1 each by Does not apply route daily 100 each 3    blood glucose test strips (ASCENSIA AUTODISC VI;ONE TOUCH ULTRA TEST VI) strip 1 each by In Vitro route daily and as needed. 100 each 3    metFORMIN (GLUCOPHAGE) 500 MG tablet TAKE 2 TABLETS BY MOUTH TWICE DAILY WITH MEALS 60 tablet 11    Febuxostat 80 MG TABS Take 80 mg by mouth daily 30 tablet 11    lisinopril (PRINIVIL;ZESTRIL) 20 MG tablet TAKE 1 TABLET ONCE DAILY 30 tablet 11    Blood Glucose Monitoring Suppl (FREESTYLE LITE) NIRMAL USE AS DIRECTED 1 Device 0    morphine sulfate ER (MS CONTIN) 15 MG CR tablet Take 15 mg by mouth daily       HORIZANT 600 MG TBCR every evening       Multiple Vitamins-Minerals (ADVANCED DIABETIC MULTIVITAMIN PO) Take 3 tablets by mouth daily.  Omega-3 Fatty Acids (FISH OIL) 1000 MG CAPS Take 2,000 mg by mouth daily. No current facility-administered medications on file prior to visit.         Past Medical History:   Diagnosis Date    Gout     Hypertension     Hypertriglyceridemia     [facility], morbid [address] [address]     Obstructive sleep apnea        Past Surgical History:   Procedure Laterality Date    BACK SURGERY      LUMBAR 10/2009    SPINE SURGERY  4-12-12    neurospine stimulator    TONSILLECTOMY AND ADENOIDECTOMY      1969       family history includes Cancer in his paternal grandfather; Heart Disease in his maternal grandmother and mother; High Blood Pressure in his maternal grandmother. Social History     Social History    Marital status:      Spouse name: N/A    Number of children: N/A    Years of education: N/A     Occupational History    Not on file. Social History Main Topics    Smoking status: Current Every Day Smoker     Packs/day: 1.50     Years: 32.00     Types: Cigarettes    Smokeless tobacco: Never Used    Alcohol use Yes      Comment: OCCASIONAL     Drug use: No    Sexual activity: Not on file     Other Topics Concern    Not on file     Social History Narrative    No narrative on file       Current Outpatient Prescriptions on File Prior to Visit   Medication Sig Dispense Refill    alogliptin (NESINA) 25 MG TABS tablet Take 1 tablet by mouth daily 30 tablet 11    colchicine (COLCRYS) 0.6 MG tablet TAKE 1 TABLET BY MOUTH TWICE DAILY WITH FOOD AS NEEDED. 60 tablet 2    FREESTYLE LANCETS MISC 1 each by Does not apply route daily 100 each 3    blood glucose test strips (ASCENSIA AUTODISC VI;ONE TOUCH ULTRA TEST VI) strip 1 each by In Vitro route daily and as needed.  100 each 3    metFORMIN (GLUCOPHAGE) 500 MG tablet TAKE 2 TABLETS BY MOUTH TWICE DAILY WITH MEALS 60 tablet 11    Febuxostat 80 MG TABS Take 80 mg by mouth daily 30 tablet 11    lisinopril (PRINIVIL;ZESTRIL) 20 MG tablet TAKE 1 TABLET ONCE DAILY 30 tablet 11    Blood Glucose Monitoring Suppl (FREESTYLE LITE) NIRMAL USE AS DIRECTED 1 Device 0    morphine sulfate ER (MS CONTIN) 15 MG CR tablet Take 15 mg by mouth daily       HORIZANT 600 MG TBCR every evening       rhonchi, symmetric air entry  Heart - normal rate, regular rhythm, normal S1, S2, no murmurs, rubs, clicks or gallops. Abdomen - soft, nondistended, nontender. No masses or organomegaly noted. Bowel sounds are positive. Extremity - no edema. Dorsalis pedis and posterior tibial pulses are symmetric. Right knee exam: large effusion. Medial and lateral collateral ligaments are intact. Anterior drawer and Lachman's test were negative. Rylie test is negative. Full range of motion with extension and flexion. Pain to palpation anteriorly. A timeout was performed immediately prior to the start of the knee aspiration and injection procedure and included the correct patient (two identifiers), correct procedure and correct site(s). Allergies were also verified. Procedure: Discussed procedure with patient as well as risks and complications and patient agreed to proceed. Right knee was prepped with alcohol and betadine swab x 2. The knee was anesthetized using 2 cc of 1% Xylocaine. An 18 g 1.5 inch needle with a 60 cc syringe was used to aspirate the right knee using an anterolateral approach with return of 120 cc of a pale cloudy yellow fluid. Synovial fluid was sent for C&S, Crystal ID and cell count. Then the injection was given through the 18 g needle using 40 mg of depomedrol and 2 cc of 1% lidocaine. Band-aid was placed. Patient tolerated procedure well. Assessment:    Diagnosis Orders   1. Effusion of bursa of right knee  CO ARTHROCENTESIS ASPIR&/INJ MAJOR JT/BURSA W/O US    Body Fluid Culture    Body Fluid Cell Count with Differential    Body Fluid Crystal   2. Type 2 diabetes mellitus without complication, without long-term current use of insulin ([facility])  Hemoglobin A1C    Lipid Panel   3. Essential hypertension     4. Obesity, morbid ([address]     5. Needs flu shot  INFLUENZA, QUADV, 3 YRS AND OLDER, IM, MDV, 0.5ML ([address])   6.  Hypertriglyceridemia  Icosapent Ethyl (VASCEPA) 1 g CAPS capsule which will only last several hours. Follow-up if symptoms persist or worsen otherwise as needed. Patient will need fasting labs prior to their next visit. Will call patient with results of testing when available and need for follow up if indicated. Return in about 3 months (around 1/26/2019) for follow up on medications, follow up on DM Type II.

## 2018-10-27 LAB
APPEARANCE FLUID: NORMAL
CELL COUNT FLUID TYPE: NORMAL
CLOT EVALUATION: NORMAL
COLOR FLUID: NORMAL
CRYSTAL CMT 2: NORMAL
CRYSTALS, FLUID: NORMAL
LYMPHOCYTES, BODY FLUID: 10 %
MONOCYTE, FLUID: 15 %
NEUTROPHIL, FLUID: 75 %
NUCLEATED CELLS FLUID: 6933 /CUMM
NUMBER OF CELLS COUNTED FLUID: 100
RBC FLUID: 1110 /CUMM
SOURCE BODY FLUID: NORMAL

## 2018-10-30 LAB
BODY FLUID CULTURE, STERILE: NORMAL
GRAM STAIN RESULT: NORMAL

## 2018-11-12 RX ORDER — COLCHICINE 0.6 MG/1
TABLET ORAL
Qty: 60 TABLET | Refills: 1 | Status: SHIPPED | OUTPATIENT
Start: 2018-11-12 | End: 2019-01-17 | Stop reason: SDUPTHER

## 2018-11-15 ENCOUNTER — OFFICE VISIT (OUTPATIENT)
Dept: FAMILY MEDICINE CLINIC | Age: 53
End: 2018-11-15
Payer: COMMERCIAL

## 2018-11-15 VITALS
HEIGHT: 73 IN | RESPIRATION RATE: 16 BRPM | SYSTOLIC BLOOD PRESSURE: 130 MMHG | TEMPERATURE: 97.3 F | HEART RATE: 72 BPM | DIASTOLIC BLOOD PRESSURE: 70 MMHG | WEIGHT: 267 LBS | BODY MASS INDEX: 35.39 KG/M2

## 2018-11-15 DIAGNOSIS — L02.211 ABSCESS OF ABDOMINAL WALL: Primary | ICD-10-CM

## 2018-11-15 PROCEDURE — G8427 DOCREV CUR MEDS BY ELIG CLIN: HCPCS | Performed by: FAMILY MEDICINE

## 2018-11-15 PROCEDURE — 3017F COLORECTAL CA SCREEN DOC REV: CPT | Performed by: FAMILY MEDICINE

## 2018-11-15 PROCEDURE — G8417 CALC BMI ABV UP PARAM F/U: HCPCS | Performed by: FAMILY MEDICINE

## 2018-11-15 PROCEDURE — 10061 I&D ABSCESS COMP/MULTIPLE: CPT | Performed by: FAMILY MEDICINE

## 2018-11-15 PROCEDURE — 99213 OFFICE O/P EST LOW 20 MIN: CPT | Performed by: FAMILY MEDICINE

## 2018-11-15 PROCEDURE — 4004F PT TOBACCO SCREEN RCVD TLK: CPT | Performed by: FAMILY MEDICINE

## 2018-11-15 PROCEDURE — G8482 FLU IMMUNIZE ORDER/ADMIN: HCPCS | Performed by: FAMILY MEDICINE

## 2018-11-15 ASSESSMENT — ENCOUNTER SYMPTOMS
COUGH: 0
WHEEZING: 0
VOMITING: 0
DIARRHEA: 0
SORE THROAT: 0
EYE REDNESS: 0
EYE PAIN: 0
ABDOMINAL PAIN: 0
NAUSEA: 0
SINUS PRESSURE: 0
SINUS PAIN: 0
EYE DISCHARGE: 0
SHORTNESS OF BREATH: 0
RHINORRHEA: 0
BLOOD IN STOOL: 0

## 2018-11-15 NOTE — PATIENT INSTRUCTIONS
Patient was instructed to wash affected area with antibacterial soap and water twice daily and apply Bacitracin afterwards. Patient may pull pack out tomorrow.

## 2018-11-15 NOTE — PROGRESS NOTES
measure about 3 cm. bowel sounds normal  Skin - normal coloration and turgor, no rashes, no suspicious skin lesions noted    A timeout was performed immediately prior to the start of the I&D procedure and included the correct patient (two identifiers), correct procedure and correct site(s). Procedure consent and allergies were also verified. Procedure: Discussed procedure with patient as well as the risks and complications and patient signed consent form. Patient was placed in supine position. Skin was prepped with an alcohol swab and anesthetized with 1 cc of 2 % Xylocaine. Skin was then prepped with a betadine swab x 2. Using sterile technique, a #11 blade scalpel was used to place a 1 cm opening in the mass with return of a yellow-grey fluid along with a small amount of cheesy white material. The entire pocket was evacuated. Culture was obtained for C&S. Bleeding was controlled with pressure. 5 cm of 1/4 inch pack was placed. Patient tolerated procedure well and sterile dressing was placed. 1. Abscess of abdominal wall  - Patient was instructed to wash affected area with antibacterial soap and water twice daily and apply a dressing afterwards. Patient was instructed to pull the pack tomorrow. - Wound Culture; Future  - sulfamethoxazole-trimethoprim (BACTRIM DS) 800-160 MG per tablet; Take 1 tablet by mouth 2 times daily for 10 days  Dispense: 20 tablet; Refill: 0. Patient is to finish the entire course as directed. - SC DRAIN SKIN ABSCESS COMPLIC  - Anaerobic and Aerobic Culture    I have reviewed the following diagnostic data: NA.  Please see report for additional information.     Orders Placed This Encounter   Procedures    Wound Culture     Standing Status:   Future     Standing Expiration Date:   11/15/2019    Anaerobic and Aerobic Culture    SC DRAIN SKIN ABSCESS COMPLIC       Orders Placed This Encounter   Medications    sulfamethoxazole-trimethoprim (BACTRIM DS) 800-160 MG per tablet Sig: Take 1 tablet by mouth 2 times daily for 10 days     Dispense:  20 tablet     Refill:  0         Return if symptoms worsen or fail to improve.

## 2018-11-16 RX ORDER — AMOXICILLIN AND CLAVULANATE POTASSIUM 875; 125 MG/1; MG/1
1 TABLET, FILM COATED ORAL 2 TIMES DAILY
Qty: 14 TABLET | Refills: 0 | Status: SHIPPED | OUTPATIENT
Start: 2018-11-16 | End: 2018-11-23

## 2018-11-18 LAB
ANAEROBIC CULTURE: ABNORMAL
GRAM STAIN RESULT: ABNORMAL
WOUND/ABSCESS: ABNORMAL

## 2018-11-20 RX ORDER — SULFAMETHOXAZOLE AND TRIMETHOPRIM 800; 160 MG/1; MG/1
1 TABLET ORAL 2 TIMES DAILY
Qty: 20 TABLET | Refills: 0 | Status: SHIPPED | OUTPATIENT
Start: 2018-11-20 | End: 2018-11-30

## 2018-11-21 ENCOUNTER — TELEPHONE (OUTPATIENT)
Dept: FAMILY MEDICINE CLINIC | Age: 53
End: 2018-11-21

## 2018-11-21 NOTE — TELEPHONE ENCOUNTER
Called patient, says wound is doing fine. It looks like it is closing up, a small amount of seepage, no fever, no nausea, no chills. Keeping it clean and dry, applying antibacterial ointment.

## 2019-01-17 DIAGNOSIS — E11.9 TYPE 2 DIABETES MELLITUS WITHOUT COMPLICATION (HCC): ICD-10-CM

## 2019-01-17 RX ORDER — COLCHICINE 0.6 MG/1
TABLET ORAL
Qty: 60 TABLET | Refills: 1 | Status: SHIPPED | OUTPATIENT
Start: 2019-01-17 | End: 2019-03-14 | Stop reason: SDUPTHER

## 2019-01-28 ENCOUNTER — HOSPITAL ENCOUNTER (OUTPATIENT)
Dept: GENERAL RADIOLOGY | Age: 54
Discharge: HOME | End: 2019-01-30
Payer: COMMERCIAL

## 2019-01-28 ENCOUNTER — OFFICE VISIT (OUTPATIENT)
Dept: FAMILY MEDICINE CLINIC | Age: 54
End: 2019-01-28
Payer: COMMERCIAL

## 2019-01-28 VITALS
HEIGHT: 73 IN | RESPIRATION RATE: 16 BRPM | TEMPERATURE: 97.8 F | BODY MASS INDEX: 35.97 KG/M2 | SYSTOLIC BLOOD PRESSURE: 106 MMHG | HEART RATE: 68 BPM | WEIGHT: 271.4 LBS | DIASTOLIC BLOOD PRESSURE: 60 MMHG

## 2019-01-28 DIAGNOSIS — I10 ESSENTIAL HYPERTENSION: ICD-10-CM

## 2019-01-28 DIAGNOSIS — M25.511 CHRONIC RIGHT SHOULDER PAIN: ICD-10-CM

## 2019-01-28 DIAGNOSIS — E11.9 TYPE 2 DIABETES MELLITUS WITHOUT COMPLICATION, WITHOUT LONG-TERM CURRENT USE OF INSULIN (HCC): ICD-10-CM

## 2019-01-28 DIAGNOSIS — G89.29 CHRONIC RIGHT SHOULDER PAIN: ICD-10-CM

## 2019-01-28 DIAGNOSIS — E66.01 OBESITY, MORBID (HCC): ICD-10-CM

## 2019-01-28 DIAGNOSIS — E11.21 DIABETES MELLITUS WITH MICROALBUMINURIC DIABETIC NEPHROPATHY (HCC): Primary | ICD-10-CM

## 2019-01-28 PROCEDURE — G8417 CALC BMI ABV UP PARAM F/U: HCPCS | Performed by: FAMILY MEDICINE

## 2019-01-28 PROCEDURE — 73030 X-RAY EXAM OF SHOULDER: CPT

## 2019-01-28 PROCEDURE — G8427 DOCREV CUR MEDS BY ELIG CLIN: HCPCS | Performed by: FAMILY MEDICINE

## 2019-01-28 PROCEDURE — 4004F PT TOBACCO SCREEN RCVD TLK: CPT | Performed by: FAMILY MEDICINE

## 2019-01-28 PROCEDURE — G8482 FLU IMMUNIZE ORDER/ADMIN: HCPCS | Performed by: FAMILY MEDICINE

## 2019-01-28 PROCEDURE — 99214 OFFICE O/P EST MOD 30 MIN: CPT | Performed by: FAMILY MEDICINE

## 2019-01-28 PROCEDURE — 2022F DILAT RTA XM EVC RTNOPTHY: CPT | Performed by: FAMILY MEDICINE

## 2019-01-28 PROCEDURE — 3046F HEMOGLOBIN A1C LEVEL >9.0%: CPT | Performed by: FAMILY MEDICINE

## 2019-01-28 PROCEDURE — 3017F COLORECTAL CA SCREEN DOC REV: CPT | Performed by: FAMILY MEDICINE

## 2019-01-28 ASSESSMENT — PATIENT HEALTH QUESTIONNAIRE - PHQ9
1. LITTLE INTEREST OR PLEASURE IN DOING THINGS: 0
2. FEELING DOWN, DEPRESSED OR HOPELESS: 0
SUM OF ALL RESPONSES TO PHQ9 QUESTIONS 1 & 2: 0
SUM OF ALL RESPONSES TO PHQ QUESTIONS 1-9: 0
SUM OF ALL RESPONSES TO PHQ QUESTIONS 1-9: 0

## 2019-01-28 ASSESSMENT — ENCOUNTER SYMPTOMS
RHINORRHEA: 0
SINUS PAIN: 0
CONSTIPATION: 0
SORE THROAT: 0
SHORTNESS OF BREATH: 0
DIARRHEA: 0
TROUBLE SWALLOWING: 0
COUGH: 0
VOMITING: 0
VOICE CHANGE: 0
ABDOMINAL PAIN: 0
BLOOD IN STOOL: 0
CHEST TIGHTNESS: 0
NAUSEA: 0
WHEEZING: 0
COLOR CHANGE: 0

## 2019-03-07 ENCOUNTER — OFFICE VISIT (OUTPATIENT)
Dept: FAMILY MEDICINE CLINIC | Age: 54
End: 2019-03-07
Payer: COMMERCIAL

## 2019-03-07 VITALS
DIASTOLIC BLOOD PRESSURE: 76 MMHG | WEIGHT: 276.8 LBS | HEART RATE: 72 BPM | BODY MASS INDEX: 36.68 KG/M2 | TEMPERATURE: 98 F | HEIGHT: 73 IN | OXYGEN SATURATION: 96 % | SYSTOLIC BLOOD PRESSURE: 110 MMHG

## 2019-03-07 DIAGNOSIS — M25.511 ARTHRALGIA OF RIGHT ACROMIOCLAVICULAR JOINT: Primary | ICD-10-CM

## 2019-03-07 DIAGNOSIS — E11.21 DIABETES MELLITUS WITH MICROALBUMINURIC DIABETIC NEPHROPATHY (HCC): ICD-10-CM

## 2019-03-07 DIAGNOSIS — E11.9 TYPE 2 DIABETES MELLITUS WITHOUT COMPLICATION, WITHOUT LONG-TERM CURRENT USE OF INSULIN (HCC): ICD-10-CM

## 2019-03-07 LAB
CHOLESTEROL, TOTAL: 157 MG/DL (ref 0–199)
HBA1C MFR BLD: 7.3 % (ref 4.8–5.9)
HDLC SERPL-MCNC: 31 MG/DL (ref 40–59)
LDL CHOLESTEROL CALCULATED: 82 MG/DL (ref 0–129)
TRIGL SERPL-MCNC: 221 MG/DL (ref 0–150)

## 2019-03-07 PROCEDURE — 3017F COLORECTAL CA SCREEN DOC REV: CPT | Performed by: FAMILY MEDICINE

## 2019-03-07 PROCEDURE — 99213 OFFICE O/P EST LOW 20 MIN: CPT | Performed by: FAMILY MEDICINE

## 2019-03-07 PROCEDURE — G8427 DOCREV CUR MEDS BY ELIG CLIN: HCPCS | Performed by: FAMILY MEDICINE

## 2019-03-07 PROCEDURE — 4004F PT TOBACCO SCREEN RCVD TLK: CPT | Performed by: FAMILY MEDICINE

## 2019-03-07 PROCEDURE — G8417 CALC BMI ABV UP PARAM F/U: HCPCS | Performed by: FAMILY MEDICINE

## 2019-03-07 PROCEDURE — G8482 FLU IMMUNIZE ORDER/ADMIN: HCPCS | Performed by: FAMILY MEDICINE

## 2019-03-07 ASSESSMENT — ENCOUNTER SYMPTOMS
COLOR CHANGE: 0
BLOOD IN STOOL: 0
RHINORRHEA: 0
VOICE CHANGE: 0
COUGH: 0
SHORTNESS OF BREATH: 0
SINUS PAIN: 0
NAUSEA: 0
CHEST TIGHTNESS: 0
VOMITING: 0
DIARRHEA: 0
SORE THROAT: 0
CONSTIPATION: 0
ABDOMINAL PAIN: 0
TROUBLE SWALLOWING: 0
WHEEZING: 0

## 2019-03-14 RX ORDER — COLCHICINE 0.6 MG/1
TABLET ORAL
Qty: 60 TABLET | Refills: 1 | Status: SHIPPED | OUTPATIENT
Start: 2019-03-14

## 2019-03-26 ENCOUNTER — TELEPHONE (OUTPATIENT)
Dept: FAMILY MEDICINE CLINIC | Age: 54
End: 2019-03-26

## 2019-09-12 ENCOUNTER — HOSPITAL ENCOUNTER (OUTPATIENT)
Dept: GENERAL RADIOLOGY | Age: 54
Discharge: HOME | End: 2019-09-14
Payer: COMMERCIAL

## 2019-09-12 DIAGNOSIS — R52 PAIN: ICD-10-CM

## 2019-09-12 PROCEDURE — 72110 X-RAY EXAM L-2 SPINE 4/>VWS: CPT

## 2020-09-29 ENCOUNTER — OFFICE VISIT (OUTPATIENT)
Dept: FAMILY MEDICINE CLINIC | Age: 55
End: 2020-09-29
Payer: COMMERCIAL

## 2020-09-29 ENCOUNTER — NURSE ONLY (OUTPATIENT)
Dept: FAMILY MEDICINE CLINIC | Age: 55
End: 2020-09-29

## 2020-09-29 ENCOUNTER — HOSPITAL ENCOUNTER (OUTPATIENT)
Age: 55
Setting detail: SPECIMEN
Discharge: HOME | End: 2020-09-29
Payer: COMMERCIAL

## 2020-09-29 VITALS
HEIGHT: 73 IN | HEART RATE: 64 BPM | OXYGEN SATURATION: 96 % | TEMPERATURE: 97.8 F | DIASTOLIC BLOOD PRESSURE: 75 MMHG | SYSTOLIC BLOOD PRESSURE: 136 MMHG | WEIGHT: 284.6 LBS | BODY MASS INDEX: 37.72 KG/M2

## 2020-09-29 DIAGNOSIS — Z01.818 PRE-OP EXAM: ICD-10-CM

## 2020-09-29 LAB
ALBUMIN SERPL-MCNC: 4.4 G/DL (ref 3.5–4.6)
ALP BLD-CCNC: 77 U/L (ref 35–104)
ALT SERPL-CCNC: 22 U/L (ref 0–41)
ANION GAP SERPL CALCULATED.3IONS-SCNC: 12 MEQ/L (ref 9–15)
APTT: 32.8 SEC (ref 24.4–36.8)
AST SERPL-CCNC: 17 U/L (ref 0–40)
BILIRUB SERPL-MCNC: 0.3 MG/DL (ref 0.2–0.7)
BUN BLDV-MCNC: 13 MG/DL (ref 6–20)
CALCIUM SERPL-MCNC: 9.5 MG/DL (ref 8.5–9.9)
CHLORIDE BLD-SCNC: 101 MEQ/L (ref 95–107)
CO2: 25 MEQ/L (ref 20–31)
CREAT SERPL-MCNC: 0.68 MG/DL (ref 0.7–1.2)
GFR AFRICAN AMERICAN: >60
GFR NON-AFRICAN AMERICAN: >60
GLOBULIN: 2.6 G/DL (ref 2.3–3.5)
GLUCOSE BLD-MCNC: 192 MG/DL (ref 70–99)
HCT VFR BLD CALC: 44.2 % (ref 42–52)
HEMOGLOBIN: 14.8 G/DL (ref 14–18)
INR BLD: 0.9
MCH RBC QN AUTO: 30.2 PG (ref 27–31.3)
MCHC RBC AUTO-ENTMCNC: 33.5 % (ref 33–37)
MCV RBC AUTO: 90 FL (ref 80–100)
PDW BLD-RTO: 15.1 % (ref 11.5–14.5)
PLATELET # BLD: 254 K/UL (ref 130–400)
POTASSIUM SERPL-SCNC: 4.3 MEQ/L (ref 3.4–4.9)
PROTHROMBIN TIME: 12.1 SEC (ref 12.3–14.9)
RBC # BLD: 4.91 M/UL (ref 4.7–6.1)
SODIUM BLD-SCNC: 138 MEQ/L (ref 135–144)
TOTAL PROTEIN: 7 G/DL (ref 6.3–8)
WBC # BLD: 10.9 K/UL (ref 4.8–10.8)

## 2020-09-29 PROCEDURE — G8417 CALC BMI ABV UP PARAM F/U: HCPCS | Performed by: NURSE PRACTITIONER

## 2020-09-29 PROCEDURE — 99214 OFFICE O/P EST MOD 30 MIN: CPT | Performed by: NURSE PRACTITIONER

## 2020-09-29 PROCEDURE — 3017F COLORECTAL CA SCREEN DOC REV: CPT | Performed by: NURSE PRACTITIONER

## 2020-09-29 PROCEDURE — G8427 DOCREV CUR MEDS BY ELIG CLIN: HCPCS | Performed by: NURSE PRACTITIONER

## 2020-09-29 PROCEDURE — 4004F PT TOBACCO SCREEN RCVD TLK: CPT | Performed by: NURSE PRACTITIONER

## 2020-09-29 PROCEDURE — 93000 ELECTROCARDIOGRAM COMPLETE: CPT | Performed by: NURSE PRACTITIONER

## 2020-09-29 RX ORDER — PEN NEEDLE, DIABETIC 31 GX3/16"
1 NEEDLE, DISPOSABLE MISCELLANEOUS PRN
COMMUNITY
Start: 2020-08-28

## 2020-09-29 RX ORDER — INSULIN DEGLUDEC INJECTION 100 U/ML
25 INJECTION, SOLUTION SUBCUTANEOUS NIGHTLY
COMMUNITY
Start: 2020-08-28

## 2020-09-29 SDOH — ECONOMIC STABILITY: TRANSPORTATION INSECURITY
IN THE PAST 12 MONTHS, HAS THE LACK OF TRANSPORTATION KEPT YOU FROM MEDICAL APPOINTMENTS OR FROM GETTING MEDICATIONS?: NO

## 2020-09-29 SDOH — ECONOMIC STABILITY: INCOME INSECURITY: HOW HARD IS IT FOR YOU TO PAY FOR THE VERY BASICS LIKE FOOD, HOUSING, MEDICAL CARE, AND HEATING?: NOT HARD AT ALL

## 2020-09-29 SDOH — ECONOMIC STABILITY: FOOD INSECURITY: WITHIN THE PAST 12 MONTHS, YOU WORRIED THAT YOUR FOOD WOULD RUN OUT BEFORE YOU GOT MONEY TO BUY MORE.: NEVER TRUE

## 2020-09-29 SDOH — ECONOMIC STABILITY: TRANSPORTATION INSECURITY
IN THE PAST 12 MONTHS, HAS LACK OF TRANSPORTATION KEPT YOU FROM MEETINGS, WORK, OR FROM GETTING THINGS NEEDED FOR DAILY LIVING?: NO

## 2020-09-29 SDOH — ECONOMIC STABILITY: FOOD INSECURITY: WITHIN THE PAST 12 MONTHS, THE FOOD YOU BOUGHT JUST DIDN'T LAST AND YOU DIDN'T HAVE MONEY TO GET MORE.: NEVER TRUE

## 2020-09-29 ASSESSMENT — PATIENT HEALTH QUESTIONNAIRE - PHQ9
SUM OF ALL RESPONSES TO PHQ9 QUESTIONS 1 & 2: 0
1. LITTLE INTEREST OR PLEASURE IN DOING THINGS: 0
2. FEELING DOWN, DEPRESSED OR HOPELESS: 0
SUM OF ALL RESPONSES TO PHQ QUESTIONS 1-9: 0
SUM OF ALL RESPONSES TO PHQ QUESTIONS 1-9: 0

## 2020-09-29 NOTE — PATIENT INSTRUCTIONS
Blood work, chest XR, COVID-19 results  Do not leave home except to get medical care while waiting for results        Patient Education   Learning About How to Prepare for Surgery  How can you prepare before surgery? You can do some things that will help you safely prepare for surgery. · Understand exactly what surgery is planned. You should know the risks, benefits, and other options. · Tell your doctors ALL the medicines, vitamins, supplements, and herbal remedies you take. Some of these can increase the risk of bleeding. Or they may interact with anesthesia. · Follow your doctor's instructions about which medicines to take or stop before your surgery. ? You may need to stop taking some medicines a week or more before surgery. ? If you take aspirin or some other blood thinner, be sure to talk to your doctor. · Follow any other instructions your doctor gave you. · If you have an advance directive, let your doctor know, and bring a copy to the hospital.   It may include a living will and a durable power of  for health care. It lets your doctor and loved ones know your health care wishes. If you don't have one, you may want to prepare one. How can you prepare on the day of surgery? Here are some tips about what to do at home before you leave for your surgery. · If your doctor told you to take your medicines on the day of surgery, take them with only a sip of water. · Follow the instructions about when to stop eating and drinking. If you don't, your surgery may be canceled. · Follow your doctor's instructions about when to bathe or shower before your surgery. · Do not shave the surgical site yourself. · Take off all jewelry and piercings. · Take out contact lenses, if you wear them. · Have a picture ID ready to take with you. Your ID will be checked before your surgery. · Know when to call your doctor. Call your doctor if you:  ? Become ill before surgery. ?  Need to reschedule. ? Have changed your mind about having the surgery. What happens before surgery? Here are some things you can expect to happen before your surgery. · Your picture ID will be checked. · The area of your body that needs surgery is often marked to make sure there are no errors. · You will be kept comfortable and safe by your anesthesia provider. The anesthesia may make you sleep. Or it may just numb the area being worked on. What happens when you are ready to go home? Be sure you have someone drive you home. Anesthesia and pain medicine make it unsafe for you to drive. You will get instructions about recovering from your surgery. This is called a discharge plan. It will cover things like diet, wound care, follow-up care, driving, and getting back to your normal routine. Follow-up care is a key part of your treatment and safety. Be sure to make and go to all appointments, and call your doctor if you are having problems. It's also a good idea to know your test results and keep a list of the medicines you take. Where can you learn more? Go to [email or web address] org and sign in to your [employer] account. Enter Q270 in the [employer] box to learn more about \"Learning About How to Prepare for Surgery. \"     If you do not have an account, please click on the \"Sign Up Now\" link. Current as of: August 22, 2019               Content Version: 12.5  © 0378-6400 [employer]. Care instructions adapted under license by [facility] ([facility]). If you have questions about a medical condition or this instruction, always ask your healthcare professional. [place] [address] any warranty or liability for your use of this information.

## 2020-09-29 NOTE — PROGRESS NOTES
Subjective:   Graeme Estrada  YOB: 1965    Date of Service:  9/29/2020    Chief Complaint   Patient presents with    Pre-op Exam     Patient presents today for pre-op exam. Revision of spinal cord stimulator battery surgery date 10/05/20. Preoperative Consultation    Merlin presents to the office today for a preoperative consultation at the request of surgeon, Dr. Adrianne Nj ,who plans on performing revision of spinal cord stimulator battery on October 5 at [address]     Planned anesthesia: MAC   Known anesthesia problems: None   Bleeding risk: No recent or remote history of abnormal bleeding  Personal or FH of DVT/PE: No    Patient objection to receiving blood products: No    Vitals:    09/29/20 1504 09/29/20 1507   BP: (!) 146/82 136/75   Site: Right Upper Arm Left Upper Arm   Position: Sitting Sitting   Cuff Size: Large Adult Large Adult   Pulse: 64    Temp: 97.8 °F (36.6 °C)    TempSrc: Temporal    SpO2: 96%    Weight: 284 lb 9.6 oz (129.1 kg)    Height: 6' 1\" (1.854 m)       Wt Readings from Last 2 Encounters:   09/29/20 284 lb 9.6 oz (129.1 kg)   03/07/19 276 lb 12.8 oz (125.6 kg)     BP Readings from Last 3 Encounters:   09/29/20 136/75   03/07/19 110/76   01/28/19 106/60        Allergies   Allergen Reactions    Demerol Rash and Other (See Comments)     FEVER    Demerol Hcl [Meperidine] Rash     Outpatient Medications Marked as Taking for the 9/29/20 encounter (Office Visit) with AJ Broussard CNP   Medication Sig Dispense Refill    colchicine (COLCRYS) 0.6 MG tablet TAKE 1 TABLET BY MOUTH TWICE DAILY WITH FOOD AS NEEDED 60 tablet 1    metFORMIN (GLUCOPHAGE) 500 MG tablet TAKE 2 TABLETS BY MOUTH TWICE DAILY WITH MEALS 120 tablet 11    lisinopril (PRINIVIL;ZESTRIL) 20 MG tablet TAKE 1 TABLET ONCE DAILY 30 tablet 11    atorvastatin (LIPITOR) 40 MG tablet TAKE 1 TABLET BY MOUTH ONCE DAILY 30 tablet 11    Icosapent Ethyl (VASCEPA) 1 g CAPS capsule Take 2 capsules by mouth 2 times daily 120 capsule 11    alogliptin (NESINA) 25 MG TABS tablet Take 1 tablet by mouth daily 30 tablet 11    FREESTYLE LANCETS MISC 1 each by Does not apply route daily 100 each 3    Febuxostat 80 MG TABS Take 80 mg by mouth daily 30 tablet 11    Blood Glucose Monitoring Suppl (FREESTYLE LITE) NIRMAL USE AS DIRECTED 1 Device 0    Omega-3 Fatty Acids (FISH OIL) 1000 MG CAPS Take 2,000 mg by mouth daily.            Patient Active Problem List   Diagnosis    Essential hypertension    Gout    Obesity, morbid ([address]    Hypertriglyceridemia    DM [facility]    Diabetes mellitus with microalbuminuric diabetic nephropathy ([facility]    External hemorrhoid       Past Medical History:   Diagnosis Date    Gout     Hypertension     Hypertriglyceridemia     Obesity[facility] morbid [facility]     Obstructive sleep apnea      Past Surgical History:   Procedure Laterality Date    BACK SURGERY      LUMBAR 10/2009    SPINE SURGERY  4-12-12    neurospine stimulator    TONSILLECTOMY AND ADENOIDECTOMY      1969     Family History   Problem Relation Age of Onset    Heart Disease Mother     High Blood Pressure Maternal Grandmother         PACEMAKER    Heart Disease Maternal Grandmother     Cancer Paternal Grandfather         GASTRIC CA     Social History     Socioeconomic History    Marital status:      Spouse name: Not on file    Number of children: Not on file    Years of education: Not on file    Highest education level: Not on file   Occupational History    Not on file   Social Needs    Financial resource strain: Not hard at all   [place] insecurity     Worry: Never true     Inability: Never true    Transportation needs     Medical: No     Non-medical: No   Tobacco Use    Smoking status: Current Every Day Smoker     Packs/day: 1.50     Years: 32.00     Pack years: 48.00     Types: Cigarettes    Smokeless tobacco: Never Used   Substance and Sexual Activity    Alcohol use: Yes     Comment: OCCASIONAL     Drug use: No    Sexual activity: Not on file   Lifestyle    Physical activity     Days per week: Not on file     Minutes per session: Not on file    Stress: Not on file   Relationships    Social connections     Talks on phone: Not on file     Gets together: Not on file     Attends Congregation service: Not on file     Active member of club or organization: Not on file     Attends meetings of clubs or organizations: Not on file     Relationship status: Not on file    Intimate partner violence     Fear of current or ex partner: Not on file     Emotionally abused: Not on file     Physically abused: Not on file     Forced sexual activity: Not on file   Other Topics Concern    Not on file   Social History Narrative    Not on file     Lab Results   Component Value Date     08/13/2018    K 4.9 08/13/2018     08/13/2018    CO2 22 08/13/2018    BUN 15 08/13/2018    CREATININE 0.68 08/13/2018    GLUCOSE 102 08/13/2018    GLUCOSE 197 04/18/2012    CALCIUM 9.3 08/13/2018      Patient's medications, allergies, past medical, surgical, social and family histories were reviewed and updated as appropriate. Review of Systems  A comprehensive review of systems was negative except for: Musculoskeletal: positive for back pain      Objective:   Physical Exam      General appearance: alert, appears stated age, cooperative and no distress  Head: Normocephalic, without obvious abnormality, atraumatic  Eyes: conjunctivae/corneas clear. PERRL, EOM's intact. Ears: normal TM's and external ear canals both ears  Nose: Nares normal. Septum midline. Mucosa normal. No drainage or sinus tenderness. Throat: lips, mucosa, and tongue normal; teeth and gums normal  Neck: no adenopathy, no JVD and supple, symmetrical, trachea midline  Back: symmetric,ROM normal. No CVA tenderness.   Lungs: clear to auscultation bilaterally  Heart: regular rate and rhythm, S1, S2 normal, no murmur, click, rub or gallop  Abdomen: soft, non-tender; bowel sounds normal; no masses,  no organomegaly  Extremities: extremities normal, atraumatic, no cyanosis or edema  Skin: Skin color, texture, turgor normal. No rashes or lesions  Neurologic: Grossly normal  Vital signs reviewed. Cardiographics  ECG: normal sinus rhythm and RBBB or bifasicular block  NO prior EKG available for comparison. Imaging  Chest X-Ray: normal + electric leads projecting in the mid-thoracic spinal canal    Lab Review   Lab Results   Component Value Date     09/29/2020    K 4.3 09/29/2020     09/29/2020    CO2 25 09/29/2020    BUN 13 09/29/2020    CREATININE 0.68 09/29/2020    GLUCOSE 192 09/29/2020    GLUCOSE 197 04/18/2012    CALCIUM 9.5 09/29/2020     Lab Results   Component Value Date    WBC 10.9 09/29/2020    HGB 14.8 09/29/2020    HCT 44.2 09/29/2020    MCV 90.0 09/29/2020     09/29/2020        Assessment:    63 y.o. male with planned surgery as above. Known risk factors for perioperative complications: None    Current medications which may produce withdrawal symptoms if withheld perioperatively: MS Contin. Plan:        1. Preoperative workup as follows: includes labs required per [facility] forms. Orders Placed This Encounter   Procedures    XR CHEST (2 VW)    CBC    Comprehensive Metabolic Panel    APTT    Protime-Inr    COVID-19 Ambulatory    EKG 12 Lead     2. Change in medication regimen before surgery: regimen to be determined by surgical team.  3. Prophylaxis for cardiac events with perioperative beta-blockers: regimen to be decided by surgical/ anesthesia team.  4. Deep vein thrombosis prophylaxis: regimen to be chosen by surgical team  5. No contraindications to planned surgery    AJ Cueva CNP    Side effects and adverse effects of any medication prescribed today, as well as treatment plan/rationale, follow-up care, and result expectations have been discussed with the patient.  Talibtez Abran expresses understanding and wishes to proceed with the plan. Discussed signs and symptoms which require immediate follow-up in ED/call to 911. Understanding verbalized. I have reviewed and updated the electronic medical record.

## 2020-10-01 NOTE — PROGRESS NOTES
Spoke with patient by reviewing [facility] instructions, he verbalized understanding.

## 2020-10-04 ENCOUNTER — ANESTHESIA EVENT (OUTPATIENT)
Dept: OPERATING ROOM | Age: 55
End: 2020-10-04
Payer: COMMERCIAL

## 2020-10-04 ASSESSMENT — LIFESTYLE VARIABLES: SMOKING_STATUS: 1

## 2020-10-04 NOTE — ANESTHESIA PRE PROCEDURE
Department of Anesthesiology  Preprocedure Note       Name:  Jatinder Poole   Age:  63 y.o.  :  1965                                          MRN:  [id number]         Date:  10/4/2020      Surgeon: Grace Elder):  Adelina Steinberg MD    Procedure: Procedure(s):  REVISION OF SPINAL CORD STIMULATOR BATTERY. (PAT AT [facility]) [employer]    Medications prior to admission:   Prior to Admission medications    Medication Sig Start Date End Date Taking?  Authorizing Provider   TRESIBA FLEXTOUCH 100 UNIT/ML SOPN Inject 25 Units into the skin nightly 20   Marcus Polanco MD   [facility] [facility] UNIFINE PENTIPS 31G X 5 MM MISC Inject 1 each into the skin as needed 20   Marcus Polanco MD   colchicine (COLCRYS) 0.6 MG tablet TAKE 1 TABLET BY MOUTH TWICE DAILY WITH FOOD AS NEEDED 3/14/19   Marcus Polanco MD   metFORMIN (GLUCOPHAGE) 500 MG tablet TAKE 2 TABLETS BY MOUTH TWICE DAILY WITH MEALS 19   Moody Barnhart-07-A 1498 AJ Florez - CNP   lisinopril (PRINIVIL;ZESTRIL) 20 MG tablet TAKE 1 TABLET ONCE DAILY 18   Marcus Polanco MD   diclofenac (VOLTAREN) 50 MG EC tablet Take 1 tablet by mouth 3 times daily 18   Historical Provider, MD   atorvastatin (LIPITOR) 40 MG tablet TAKE 1 TABLET BY MOUTH ONCE DAILY 10/26/18   Marcus Polanco MD   Icosapent Ethyl (VASCEPA) 1 g CAPS capsule Take 2 capsules by mouth 2 times daily 10/26/18   Marcus Polanco MD   alogliptin (NESINA) 25 MG TABS tablet Take 1 tablet by mouth daily 18   Marcus Polanco MD   FREESTYLE LANCETS MISC 1 each by Does not apply route daily 18   Marcus Polanco MD   Febuxostat 80 MG TABS Take 80 mg by mouth daily 18   Marcus Polanco MD   Blood Glucose Monitoring Suppl (FREESTYLE LITE) NIRMAL USE AS DIRECTED 7/15/16   Marcus Polanco MD   morphine sulfate ER (MS CONTIN) 15 MG CR tablet Take 15 mg by mouth daily  16   Historical Provider, MD   HORIZANT 600 MG TBCR every evening  16   Historical Provider, MD   Multiple Vitamins-Minerals (ADVANCED DIABETIC MULTIVITAMIN PO) Take 3 tablets by mouth daily. Historical Provider, MD   Omega-3 Fatty Acids (FISH OIL) 1000 MG CAPS Take 2,000 mg by mouth daily. Historical Provider, MD       Current medications:    No current facility-administered medications for this encounter. Current Outpatient Medications   Medication Sig Dispense Refill    TRESIBA FLEXTOUCH 100 UNIT/ML SOPN Inject 25 Units into the skin nightly      DRUG MART UNIFINE PENTIPS 31G X 5 MM MISC Inject 1 each into the skin as needed      colchicine (COLCRYS) 0.6 MG tablet TAKE 1 TABLET BY MOUTH TWICE DAILY WITH FOOD AS NEEDED 60 tablet 1    metFORMIN (GLUCOPHAGE) 500 MG tablet TAKE 2 TABLETS BY MOUTH TWICE DAILY WITH MEALS 120 tablet 11    lisinopril (PRINIVIL;ZESTRIL) 20 MG tablet TAKE 1 TABLET ONCE DAILY 30 tablet 11    diclofenac (VOLTAREN) 50 MG EC tablet Take 1 tablet by mouth 3 times daily      atorvastatin (LIPITOR) 40 MG tablet TAKE 1 TABLET BY MOUTH ONCE DAILY 30 tablet 11    Icosapent Ethyl (VASCEPA) 1 g CAPS capsule Take 2 capsules by mouth 2 times daily 120 capsule 11    alogliptin (NESINA) 25 MG TABS tablet Take 1 tablet by mouth daily 30 tablet 11    FREESTYLE LANCETS MISC 1 each by Does not apply route daily 100 each 3    Febuxostat 80 MG TABS Take 80 mg by mouth daily 30 tablet 11    Blood Glucose Monitoring Suppl (FREESTYLE LITE) NIRMAL USE AS DIRECTED 1 Device 0    morphine sulfate ER (MS CONTIN) 15 MG CR tablet Take 15 mg by mouth daily       HORIZANT 600 MG TBCR every evening       Multiple Vitamins-Minerals (ADVANCED DIABETIC MULTIVITAMIN PO) Take 3 tablets by mouth daily.  Omega-3 Fatty Acids (FISH OIL) 1000 MG CAPS Take 2,000 mg by mouth daily. Allergies:     Allergies   Allergen Reactions    Demerol Rash and Other (See Comments)     FEVER    Demerol Hcl [Meperidine] Rash       Problem List:    Patient Active Problem List   Diagnosis Code    Essential hypertension I10    Gout M10.9    Obesity, morbid ([facility] E66.01    Hypertriglyceridemia U29.2    DM w/o complication type II ([facility] E11.9    Diabetes mellitus with microalbuminuric diabetic nephropathy (HCC) E11.21    External hemorrhoid K64.4       Past Medical History:        Diagnosis Date    Gout     Hypertension     Hypertriglyceridemia     [facility]     Obstructive sleep apnea        Past Surgical History:        Procedure Laterality Date    BACK SURGERY      LUMBAR 10/2009    SPINE SURGERY  4-12-12    neurospine stimulator    TONSILLECTOMY AND ADENOIDECTOMY      1969       Social History:    Social History     Tobacco Use    Smoking status: Current Every Day Smoker     Packs/day: 1.50     Years: 32.00     Pack years: 48.00     Types: Cigarettes    Smokeless tobacco: Never Used   Substance Use Topics    Alcohol use: Yes     Comment: OCCASIONAL                                 Ready to quit: Not Answered  Counseling given: Not Answered      Vital Signs (Current): There were no vitals filed for this visit.                                            BP Readings from Last 3 Encounters:   09/29/20 136/75   03/07/19 110/76   01/28/19 106/60       NPO Status:                                                                                 BMI:   Wt Readings from Last 3 Encounters:   09/29/20 284 lb 9.6 oz (129.1 kg)   03/07/19 276 lb 12.8 oz (125.6 kg)   01/28/19 271 lb 6.4 oz (123.1 kg)     There is no height or weight on file to calculate BMI.    CBC:   Lab Results   Component Value Date    WBC 10.9 09/29/2020    RBC 4.91 09/29/2020    RBC 4.92 04/18/2012    HGB 14.8 09/29/2020    HCT 44.2 09/29/2020    MCV 90.0 09/29/2020    RDW 15.1 09/29/2020     09/29/2020       CMP:   Lab Results   Component Value Date     09/29/2020    K 4.3 09/29/2020     09/29/2020    CO2 25 09/29/2020    BUN 13 09/29/2020    CREATININE 0.68 09/29/2020    GFRAA >60.0 09/29/2020    LABGLOM >60.0 09/29/2020    GLUCOSE 192 09/29/2020    GLUCOSE 197 04/18/2012    PROT 7.0 09/29/2020    CALCIUM 9.5 09/29/2020    BILITOT 0.3 09/29/2020    ALKPHOS 77 09/29/2020    AST 17 09/29/2020    ALT 22 09/29/2020       POC Tests: No results for input(s): POCGLU, POCNA, POCK, POCCL, POCBUN, POCHEMO, POCHCT in the last 72 hours. Coags:   Lab Results   Component Value Date    PROTIME 12.1 09/29/2020    PROTIME 9.9 04/18/2012    INR 0.9 09/29/2020    APTT 32.8 09/29/2020       HCG (If Applicable): No results found for: PREGTESTUR, PREGSERUM, HCG, HCGQUANT     ABGs: No results found for: PHART, PO2ART, MCU9NMC, RKB5BKV, BEART, P7QUESGA     Type & Screen (If Applicable):  No results found for: LABABO, LABRH    Drug/Infectious Status (If Applicable):  No results found for: HIV, HEPCAB    COVID-19 Screening (If Applicable):   Lab Results   Component Value Date    COVID19 Not Detected 09/29/2020         Anesthesia Evaluation  Patient summary reviewed and Nursing notes reviewed  Airway: Mallampati: II  TM distance: >3 FB   Neck ROM: full  Mouth opening: > = 3 FB Dental: normal exam         Pulmonary:   (+) sleep apnea:  current smoker                           Cardiovascular:    (+) hypertension:, hyperlipidemia         Beta Blocker:  Not on Beta Blocker         Neuro/Psych:                ROS comment: Chronic pain management GI/Hepatic/Renal:   (+) morbid obesity (class II obesity)          Endo/Other:    (+) DiabetesType II DM, , .                 Abdominal:           Vascular: negative vascular ROS. Anesthesia Plan      MAC     ASA 3       Induction: intravenous. MIPS: Prophylactic antiemetics administered. Anesthetic plan and risks discussed with patient. Plan discussed with CRNA.     Attending anesthesiologist reviewed and agrees with Elena Raygoza MD   10/4/2020

## 2020-10-05 ENCOUNTER — HOSPITAL ENCOUNTER (OUTPATIENT)
Age: 55
Setting detail: OUTPATIENT SURGERY
Discharge: HOME | End: 2020-10-05
Attending: SPECIALIST | Admitting: SPECIALIST
Payer: COMMERCIAL

## 2020-10-05 ENCOUNTER — ANESTHESIA (OUTPATIENT)
Dept: OPERATING ROOM | Age: 55
End: 2020-10-05
Payer: COMMERCIAL

## 2020-10-05 VITALS
OXYGEN SATURATION: 97 % | BODY MASS INDEX: 35.29 KG/M2 | HEART RATE: 50 BPM | TEMPERATURE: 98 F | HEIGHT: 74 IN | DIASTOLIC BLOOD PRESSURE: 73 MMHG | WEIGHT: 275 LBS | RESPIRATION RATE: 12 BRPM | SYSTOLIC BLOOD PRESSURE: 130 MMHG

## 2020-10-05 VITALS — SYSTOLIC BLOOD PRESSURE: 146 MMHG | DIASTOLIC BLOOD PRESSURE: 74 MMHG | OXYGEN SATURATION: 98 %

## 2020-10-05 LAB
GLUCOSE BLD-MCNC: 163 MG/DL (ref 60–115)
PERFORMED ON: ABNORMAL

## 2020-10-05 PROCEDURE — 2580000003 HC RX 258: Performed by: ANESTHESIOLOGY

## 2020-10-05 PROCEDURE — 3700000000 HC ANESTHESIA ATTENDED CARE: Performed by: SPECIALIST

## 2020-10-05 PROCEDURE — 2580000003 HC RX 258: Performed by: NURSE ANESTHETIST, CERTIFIED REGISTERED

## 2020-10-05 PROCEDURE — 6360000002 HC RX W HCPCS: Performed by: NURSE ANESTHETIST, CERTIFIED REGISTERED

## 2020-10-05 PROCEDURE — 2580000003 HC RX 258: Performed by: SPECIALIST

## 2020-10-05 PROCEDURE — 6370000000 HC RX 637 (ALT 250 FOR IP): Performed by: SPECIALIST

## 2020-10-05 PROCEDURE — 7100000010 HC PHASE II RECOVERY - FIRST 15 MIN: Performed by: SPECIALIST

## 2020-10-05 PROCEDURE — 2709999900 HC NON-CHARGEABLE SUPPLY: Performed by: SPECIALIST

## 2020-10-05 PROCEDURE — C1820 GENERATOR NEURO RECHG BAT SY: HCPCS | Performed by: SPECIALIST

## 2020-10-05 PROCEDURE — 3700000001 HC ADD 15 MINUTES (ANESTHESIA): Performed by: SPECIALIST

## 2020-10-05 PROCEDURE — 7100000011 HC PHASE II RECOVERY - ADDTL 15 MIN: Performed by: SPECIALIST

## 2020-10-05 PROCEDURE — 3600000004 HC SURGERY LEVEL 4 BASE: Performed by: SPECIALIST

## 2020-10-05 PROCEDURE — 2500000003 HC RX 250 WO HCPCS: Performed by: SPECIALIST

## 2020-10-05 PROCEDURE — 2720000010 HC SURG SUPPLY STERILE: Performed by: SPECIALIST

## 2020-10-05 PROCEDURE — 3600000014 HC SURGERY LEVEL 4 ADDTL 15MIN: Performed by: SPECIALIST

## 2020-10-05 PROCEDURE — 6360000002 HC RX W HCPCS: Performed by: SPECIALIST

## 2020-10-05 DEVICE — DEVICE NEUROSTIMULATOR 13.9CC W1.9XH2.2IN 29.1GM RECHRG: Type: IMPLANTABLE DEVICE | Site: BUTTOCKS | Status: FUNCTIONAL

## 2020-10-05 RX ORDER — SODIUM CHLORIDE, SODIUM LACTATE, POTASSIUM CHLORIDE, CALCIUM CHLORIDE 600; 310; 30; 20 MG/100ML; MG/100ML; MG/100ML; MG/100ML
INJECTION, SOLUTION INTRAVENOUS CONTINUOUS PRN
Status: DISCONTINUED | OUTPATIENT
Start: 2020-10-05 | End: 2020-10-05 | Stop reason: SDUPTHER

## 2020-10-05 RX ORDER — DIPHENHYDRAMINE HYDROCHLORIDE 50 MG/ML
12.5 INJECTION INTRAMUSCULAR; INTRAVENOUS
Status: DISCONTINUED | OUTPATIENT
Start: 2020-10-05 | End: 2020-10-05 | Stop reason: HOSPADM

## 2020-10-05 RX ORDER — ONDANSETRON 2 MG/ML
4 INJECTION INTRAMUSCULAR; INTRAVENOUS
Status: DISCONTINUED | OUTPATIENT
Start: 2020-10-05 | End: 2020-10-05 | Stop reason: HOSPADM

## 2020-10-05 RX ORDER — METOCLOPRAMIDE HYDROCHLORIDE 5 MG/ML
10 INJECTION INTRAMUSCULAR; INTRAVENOUS
Status: DISCONTINUED | OUTPATIENT
Start: 2020-10-05 | End: 2020-10-05 | Stop reason: HOSPADM

## 2020-10-05 RX ORDER — SODIUM CHLORIDE 0.9 % (FLUSH) 0.9 %
10 SYRINGE (ML) INJECTION PRN
Status: DISCONTINUED | OUTPATIENT
Start: 2020-10-05 | End: 2020-10-05 | Stop reason: HOSPADM

## 2020-10-05 RX ORDER — SODIUM CHLORIDE 0.9 % (FLUSH) 0.9 %
10 SYRINGE (ML) INJECTION EVERY 12 HOURS SCHEDULED
Status: DISCONTINUED | OUTPATIENT
Start: 2020-10-05 | End: 2020-10-05 | Stop reason: HOSPADM

## 2020-10-05 RX ORDER — FENTANYL CITRATE 50 UG/ML
INJECTION, SOLUTION INTRAMUSCULAR; INTRAVENOUS PRN
Status: DISCONTINUED | OUTPATIENT
Start: 2020-10-05 | End: 2020-10-05 | Stop reason: SDUPTHER

## 2020-10-05 RX ORDER — MIDAZOLAM HYDROCHLORIDE 1 MG/ML
INJECTION INTRAMUSCULAR; INTRAVENOUS PRN
Status: DISCONTINUED | OUTPATIENT
Start: 2020-10-05 | End: 2020-10-05 | Stop reason: SDUPTHER

## 2020-10-05 RX ORDER — HYDROCODONE BITARTRATE AND ACETAMINOPHEN 5; 325 MG/1; MG/1
1 TABLET ORAL PRN
Status: DISCONTINUED | OUTPATIENT
Start: 2020-10-05 | End: 2020-10-05 | Stop reason: HOSPADM

## 2020-10-05 RX ORDER — LABETALOL 20 MG/4 ML (5 MG/ML) INTRAVENOUS SYRINGE
PRN
Status: DISCONTINUED | OUTPATIENT
Start: 2020-10-05 | End: 2020-10-05 | Stop reason: SDUPTHER

## 2020-10-05 RX ORDER — LIDOCAINE HYDROCHLORIDE 10 MG/ML
1 INJECTION, SOLUTION EPIDURAL; INFILTRATION; INTRACAUDAL; PERINEURAL
Status: DISCONTINUED | OUTPATIENT
Start: 2020-10-05 | End: 2020-10-05 | Stop reason: HOSPADM

## 2020-10-05 RX ORDER — SODIUM CHLORIDE, SODIUM LACTATE, POTASSIUM CHLORIDE, CALCIUM CHLORIDE 600; 310; 30; 20 MG/100ML; MG/100ML; MG/100ML; MG/100ML
INJECTION, SOLUTION INTRAVENOUS CONTINUOUS
Status: DISCONTINUED | OUTPATIENT
Start: 2020-10-05 | End: 2020-10-05 | Stop reason: HOSPADM

## 2020-10-05 RX ORDER — MAGNESIUM HYDROXIDE 1200 MG/15ML
LIQUID ORAL CONTINUOUS PRN
Status: COMPLETED | OUTPATIENT
Start: 2020-10-05 | End: 2020-10-05

## 2020-10-05 RX ORDER — HYDROCODONE BITARTRATE AND ACETAMINOPHEN 5; 325 MG/1; MG/1
2 TABLET ORAL PRN
Status: DISCONTINUED | OUTPATIENT
Start: 2020-10-05 | End: 2020-10-05 | Stop reason: HOSPADM

## 2020-10-05 RX ORDER — PROPOFOL 10 MG/ML
INJECTION, EMULSION INTRAVENOUS CONTINUOUS PRN
Status: DISCONTINUED | OUTPATIENT
Start: 2020-10-05 | End: 2020-10-05 | Stop reason: SDUPTHER

## 2020-10-05 RX ADMIN — CEFAZOLIN 3 G: 10 INJECTION, POWDER, FOR SOLUTION INTRAVENOUS at 07:58

## 2020-10-05 RX ADMIN — MIDAZOLAM HYDROCHLORIDE 2 MG: 2 INJECTION, SOLUTION INTRAMUSCULAR; INTRAVENOUS at 07:58

## 2020-10-05 RX ADMIN — SODIUM CHLORIDE, POTASSIUM CHLORIDE, SODIUM LACTATE AND CALCIUM CHLORIDE: 600; 310; 30; 20 INJECTION, SOLUTION INTRAVENOUS at 06:33

## 2020-10-05 RX ADMIN — FENTANYL CITRATE 25 MCG: 50 INJECTION, SOLUTION INTRAMUSCULAR; INTRAVENOUS at 08:05

## 2020-10-05 RX ADMIN — PROPOFOL 100 MCG/KG/MIN: 10 INJECTION, EMULSION INTRAVENOUS at 08:05

## 2020-10-05 RX ADMIN — FENTANYL CITRATE 25 MCG: 50 INJECTION, SOLUTION INTRAMUSCULAR; INTRAVENOUS at 07:58

## 2020-10-05 RX ADMIN — SODIUM CHLORIDE, POTASSIUM CHLORIDE, SODIUM LACTATE AND CALCIUM CHLORIDE: 600; 310; 30; 20 INJECTION, SOLUTION INTRAVENOUS at 07:58

## 2020-10-05 RX ADMIN — LABETALOL 20 MG/4 ML (5 MG/ML) INTRAVENOUS SYRINGE 5 MG: at 08:36

## 2020-10-05 RX ADMIN — LABETALOL 20 MG/4 ML (5 MG/ML) INTRAVENOUS SYRINGE 5 MG: at 08:31

## 2020-10-05 ASSESSMENT — PULMONARY FUNCTION TESTS
PIF_VALUE: 1
PIF_VALUE: 1
PIF_VALUE: 0
PIF_VALUE: 0
PIF_VALUE: 1
PIF_VALUE: 0
PIF_VALUE: 1
PIF_VALUE: 0
PIF_VALUE: 1
PIF_VALUE: 0
PIF_VALUE: 1
PIF_VALUE: 0
PIF_VALUE: 1
PIF_VALUE: 0
PIF_VALUE: 1
PIF_VALUE: 0
PIF_VALUE: 1
PIF_VALUE: 1
PIF_VALUE: 0
PIF_VALUE: 1
PIF_VALUE: 1
PIF_VALUE: 0
PIF_VALUE: 1
PIF_VALUE: 1
PIF_VALUE: 0
PIF_VALUE: 0
PIF_VALUE: 1
PIF_VALUE: 0
PIF_VALUE: 1
PIF_VALUE: 0
PIF_VALUE: 1
PIF_VALUE: 0

## 2020-10-05 NOTE — OP NOTE
Operative Note      Patient: Lesvia Peña  YOB: 1965  MRN: [id number]    Date of Procedure: 10/5/2020    Pre-Op Diagnosis: POST LAMINECTOMY SYNDROME    Post-Op Diagnosis: Same       Procedure(s):  REVISION OF SPINAL CORD STIMULATOR BATTERY    Surgeon(s):  Fly Gregory MD    Assistant:   First Assistant: Joe Ramirez    Anesthesia: Monitor Anesthesia Care    Estimated Blood Loss (mL): Minimal    Complications: None    Specimens:   * No specimens in log *    Implants:  Implant Name Type Inv.  Item Serial No.  Lot No. LRB No. Used Action   STIMULATOR INTELLIS ADAPTIVE STIM MRI - [id number] Spine:Stimulator STIMULATOR INTELLIS ADAPTIVE STIM MRI [id number] [employer] [employer]  Right 1 Implanted         Drains: * No LDAs found *    Findings: see the op note     Detailed Description of Procedure:   See the dictation     Electronically signed by Fly Gregory MD on 10/5/2020 at 10:53 AM

## 2020-10-05 NOTE — OP NOTE
[address]                      [address], [address] [facility]                                OPERATIVE REPORT    PATIENT NAME: Jeanne Felix                       :        1965  MED REC NO:   [id number]                            ROOM:  ACCOUNT NO:   [de-identified]                           ADMIT DATE: 10/05/2020  PROVIDER:     Tunde Tapia MD    DATE OF PROCEDURE:  10/05/2020    PREOPERATIVE DIAGNOSES:  Degenerative disk disease of lumbar spine and  lumbar radiculopathy. POSTOPERATIVE DIAGNOSES:  Degenerative disk disease of lumbar spine and  lumbar radiculopathy. PROCEDURE:  Change of battery for spinal cord stimulator, company  [employer]. SURGEON:  Tunde Tapia MD    ANESTHESIA:  MAC.    COMPLICATIONS:  None. BLOOD LOSS:  None. OPERATIVE PROCEDURE:  The patient was brought to the operating room. He  was in prone position. Right hip area was thoroughly cleaned and draped  with aseptic technique. On local anesthetic, which was combination of  1% lidocaine mixed with 0.5% Marcaine in equal amount was injected over  the scar tissue. The incision was made. Old battery was found. It was  removed from the leads by undoing the screws. Then, new generator was  brought to the field. It was connected to the leads and impedances were  checked. They were within normal limits. Both screws were tightened. Battery was put back in the pocket. It was sutured to the fascia with  2-0 silk and then wounds were closed with 3-0 Vicryl, 4-0 Vicryl, and  the patient was then discharged in satisfactory condition after  thoroughly programing the device. He will be seen in the office in few  weeks' time.         Monique King MD    D: 10/05/2020 11:11:57       T: 10/05/2020 12:13:08     [email or web address]  Job#: [id number]     Doc#: [id number]    CC:

## 2020-10-05 NOTE — PROGRESS NOTES
Pt given DC instructions no further questions, verbalized understanding and stated he has been through this before.  Electronically signed by Tina Sanchez RN on 10/5/2020 at 10:03 AM

## 2020-10-05 NOTE — OP NOTE
Operative Note      Patient: Osei Lincoln  YOB: 1965  MRN: [id number]    Date of Procedure: 10/5/2020    Pre-Op Diagnosis: POST LAMINECTOMY SYNDROME    Post-Op Diagnosis: Same       Procedure(s):  REVISION OF SPINAL CORD STIMULATOR BATTERY    Surgeon(s):  Joseph Dodge MD    Assistant:   First Assistant: Nan Kanner    Anesthesia: Monitor Anesthesia Care    Estimated Blood Loss (mL): Minimal    Complications: None    Specimens:   * No specimens in log *    Implants:  Implant Name Type Inv.  Item Serial No.  Lot No. LRB No. Used Action   STIMULATOR INTELLIS ADAPTIVE STIM MRI - [id number] Spine:Stimulator STIMULATOR INTELLIS ADAPTIVE STIM MRI [id number] [employer] [employer]  Right 1 Implanted         Drains: * No LDAs found *    Findings: see op note     Detailed Description of Procedure:       Electronically signed by Joseph Dodge MD on 10/5/2020 at 8:42 AM

## 2020-10-05 NOTE — ANESTHESIA POSTPROCEDURE EVALUATION
Department of Anesthesiology  Postprocedure Note    Patient: John Eckert  MRN: [id number]  YOB: 1965  Date of evaluation: 10/5/2020  Time:  8:54 AM     Procedure Summary     Date:  10/05/20 Room / Location:  [address]    Anesthesia Start:  0536 Anesthesia Stop:  8858    Procedure:  REVISION OF SPINAL CORD STIMULATOR BATTERY (N/A ) Diagnosis:  (POST LAMINECTOMY SYNDROME)    Surgeon:  Fletcher Smith MD Responsible Provider:  AJ Fitzgerald CRNA    Anesthesia Type:  MAC ASA Status:  3          Anesthesia Type: MAC    David Phase I: David Score: 10    David Phase II:      Last vitals: Reviewed and per EMR flowsheets.        Anesthesia Post Evaluation    Patient location during evaluation: PACU  Patient participation: complete - patient participated  Level of consciousness: awake  Pain score: 0  Airway patency: patent  Nausea & Vomiting: no nausea  Complications: no  Cardiovascular status: hemodynamically stable  Respiratory status: acceptable  Hydration status: stable

## 2020-10-05 NOTE — BRIEF OP NOTE
Brief Postoperative Note      Patient: Leni Reyes  YOB: 1965  MRN: [id number]    Date of Procedure: 10/5/2020    Pre-Op Diagnosis: POST LAMINECTOMY SYNDROME    Post-Op Diagnosis: Same       Procedure(s):  REVISION OF SPINAL CORD STIMULATOR BATTERY    Surgeon(s):  Graeme Canales MD    Assistant:  First Assistant: Moises Mcbride    Anesthesia: Monitor Anesthesia Care    Estimated Blood Loss (mL): Minimal    Complications: None    Specimens:   * No specimens in log *    Implants:  Implant Name Type Inv.  Item Serial No.  Lot No. LRB No. Used Action   STIMULATOR INTELLIS ADAPTIVE STIM MRI - [id number] Spine:Stimulator STIMULATOR INTELLIS ADAPTIVE STIM MRI [id number] [employer] [employer]  Right 1 Implanted         Drains: * No LDAs found *    Findings: see op note     Electronically signed by Graeme Canales MD on 10/5/2020 at 8:41 AM

## 2020-10-05 NOTE — BRIEF OP NOTE
Brief Postoperative Note      Patient: Tavares Davalos  YOB: 1965  MRN: [id number]    Date of Procedure: 10/5/2020    Pre-Op Diagnosis: POST LAMINECTOMY SYNDROME    Post-Op Diagnosis: Same       Procedure(s):  REVISION OF SPINAL CORD STIMULATOR BATTERY    Surgeon(s):  Chetan Oropeza MD    Assistant:  First Assistant: Gerald Ricardo    Anesthesia: Monitor Anesthesia Care    Estimated Blood Loss (mL): Minimal    Complications: None    Specimens:   * No specimens in log *    Implants:  Implant Name Type Inv.  Item Serial No.  Lot No. LRB No. Used Action   STIMULATOR INTELLIS ADAPTIVE STIM MRI - [id number] Spine:Stimulator STIMULATOR INTELLIS ADAPTIVE STIM MRI [id number] [employer] [employer]  Right 1 Implanted         Drains: * No LDAs found *    Findings: see the op note     Electronically signed by Chetan Oropeza MD on 10/5/2020 at 10:53 AM

## 2023-03-31 LAB
CREATININE (MG/DL) IN SER/PLAS: 0.79 MG/DL (ref 0.5–1.3)
GFR MALE: >90 ML/MIN/1.73M2
UREA NITROGEN (MG/DL) IN SER/PLAS: 11 MG/DL (ref 6–23)

## 2023-04-28 DIAGNOSIS — I10 BENIGN ESSENTIAL HTN: ICD-10-CM

## 2023-04-28 DIAGNOSIS — E11.65 UNCONTROLLED TYPE 2 DIABETES MELLITUS WITH HYPERGLYCEMIA (MULTI): Primary | ICD-10-CM

## 2023-04-28 PROBLEM — D72.829 LEUKOCYTOSIS: Status: ACTIVE | Noted: 2023-04-28

## 2023-04-28 PROBLEM — M25.519 SHOULDER PAIN: Status: ACTIVE | Noted: 2023-04-28

## 2023-04-28 PROBLEM — E11.40 DIABETIC NEUROPATHY (MULTI): Status: ACTIVE | Noted: 2023-04-28

## 2023-04-28 PROBLEM — L72.0 EPIDERMAL INCLUSION CYST: Status: ACTIVE | Noted: 2023-04-28

## 2023-04-28 PROBLEM — E78.00 PURE HYPERCHOLESTEROLEMIA: Status: ACTIVE | Noted: 2023-04-28

## 2023-04-28 PROBLEM — L30.9 DERMATITIS OF FACE: Status: ACTIVE | Noted: 2023-04-28

## 2023-04-28 PROBLEM — B07.8 OTHER VIRAL WARTS: Status: RESOLVED | Noted: 2023-04-28 | Resolved: 2023-04-28

## 2023-04-28 PROBLEM — J01.00 ACUTE NON-RECURRENT MAXILLARY SINUSITIS: Status: RESOLVED | Noted: 2023-04-28 | Resolved: 2023-04-28

## 2023-04-28 PROBLEM — L02.11 ABSCESS OF SKIN OF NECK: Status: RESOLVED | Noted: 2023-04-28 | Resolved: 2023-04-28

## 2023-04-28 PROBLEM — M19.019 DJD OF AC (ACROMIOCLAVICULAR) JOINT: Status: ACTIVE | Noted: 2023-04-28

## 2023-04-28 PROBLEM — M10.9 GOUT: Status: ACTIVE | Noted: 2023-04-28

## 2023-04-28 PROBLEM — M75.42 IMPINGEMENT SYNDROME OF LEFT SHOULDER: Status: ACTIVE | Noted: 2023-04-28

## 2023-04-28 RX ORDER — LISINOPRIL 20 MG/1
1 TABLET ORAL DAILY
COMMUNITY
Start: 2019-12-10 | End: 2023-04-28 | Stop reason: SDUPTHER

## 2023-04-28 RX ORDER — LANCETS 28 GAUGE
EACH MISCELLANEOUS
COMMUNITY
Start: 2023-04-04 | End: 2023-04-28 | Stop reason: SDUPTHER

## 2023-04-28 NOTE — TELEPHONE ENCOUNTER
Received a fax from [facility] requesting refill for the patient's prescription of FreeStyle lancets. Medication has been pended to the provider for approval.     Last Visit: 12/5/22  Next Visit: 5/8/23    Last refill: 5/4/22

## 2023-04-29 RX ORDER — LANCETS 28 GAUGE
EACH MISCELLANEOUS
Qty: 300 EACH | Refills: 3 | Status: SHIPPED | OUTPATIENT
Start: 2023-04-29

## 2023-04-29 RX ORDER — LISINOPRIL 20 MG/1
20 TABLET ORAL DAILY
Qty: 90 TABLET | Refills: 3 | Status: SHIPPED | OUTPATIENT
Start: 2023-04-29 | End: 2023-06-29 | Stop reason: SDUPTHER

## 2023-05-08 PROBLEM — E66.09 CLASS 1 OBESITY DUE TO EXCESS CALORIES WITH SERIOUS COMORBIDITY AND BODY MASS INDEX (BMI) OF 33.0 TO 33.9 IN ADULT: Status: ACTIVE | Noted: 2023-05-08

## 2023-05-08 PROBLEM — E66.811 CLASS 1 OBESITY DUE TO EXCESS CALORIES WITH SERIOUS COMORBIDITY AND BODY MASS INDEX (BMI) OF 33.0 TO 33.9 IN ADULT: Status: ACTIVE | Noted: 2023-05-08

## 2023-05-08 PROBLEM — E29.1 HYPOGONADISM IN MALE: Status: ACTIVE | Noted: 2023-05-08

## 2023-06-28 PROBLEM — H40.013 OPEN ANGLE WITH BORDERLINE FINDINGS, LOW RISK, BILATERAL: Status: ACTIVE | Noted: 2023-06-28

## 2023-06-28 PROBLEM — H52.4 PRESBYOPIA: Status: ACTIVE | Noted: 2023-06-28

## 2023-06-29 ENCOUNTER — OFFICE VISIT (OUTPATIENT)
Dept: PRIMARY CARE | Facility: CLINIC | Age: 58
End: 2023-06-29
Payer: COMMERCIAL

## 2023-06-29 VITALS
RESPIRATION RATE: 20 BRPM | DIASTOLIC BLOOD PRESSURE: 82 MMHG | HEART RATE: 74 BPM | TEMPERATURE: 98 F | HEIGHT: 74 IN | WEIGHT: 260 LBS | BODY MASS INDEX: 33.37 KG/M2 | SYSTOLIC BLOOD PRESSURE: 136 MMHG

## 2023-06-29 DIAGNOSIS — E78.00 PURE HYPERCHOLESTEROLEMIA: ICD-10-CM

## 2023-06-29 DIAGNOSIS — Z72.0 TOBACCO USE: ICD-10-CM

## 2023-06-29 DIAGNOSIS — M1A.09X0 CHRONIC GOUT OF MULTIPLE SITES, UNSPECIFIED CAUSE: Primary | ICD-10-CM

## 2023-06-29 DIAGNOSIS — I10 BENIGN ESSENTIAL HTN: ICD-10-CM

## 2023-06-29 DIAGNOSIS — Z79.4 TYPE 2 DIABETES MELLITUS WITHOUT COMPLICATION, WITH LONG-TERM CURRENT USE OF INSULIN (MULTI): ICD-10-CM

## 2023-06-29 DIAGNOSIS — E11.9 TYPE 2 DIABETES MELLITUS WITHOUT COMPLICATION, WITH LONG-TERM CURRENT USE OF INSULIN (MULTI): ICD-10-CM

## 2023-06-29 PROBLEM — M25.519 SHOULDER PAIN: Status: RESOLVED | Noted: 2023-04-28 | Resolved: 2023-06-29

## 2023-06-29 PROBLEM — L30.9 DERMATITIS OF FACE: Status: RESOLVED | Noted: 2023-04-28 | Resolved: 2023-06-29

## 2023-06-29 PROBLEM — H40.013 OPEN ANGLE WITH BORDERLINE FINDINGS, LOW RISK, BILATERAL: Status: RESOLVED | Noted: 2023-06-28 | Resolved: 2023-06-29

## 2023-06-29 PROBLEM — D72.829 LEUKOCYTOSIS: Status: RESOLVED | Noted: 2023-04-28 | Resolved: 2023-06-29

## 2023-06-29 PROCEDURE — 3079F DIAST BP 80-89 MM HG: CPT | Performed by: FAMILY MEDICINE

## 2023-06-29 PROCEDURE — 3075F SYST BP GE 130 - 139MM HG: CPT | Performed by: FAMILY MEDICINE

## 2023-06-29 PROCEDURE — 4010F ACE/ARB THERAPY RXD/TAKEN: CPT | Performed by: FAMILY MEDICINE

## 2023-06-29 PROCEDURE — 4004F PT TOBACCO SCREEN RCVD TLK: CPT | Performed by: FAMILY MEDICINE

## 2023-06-29 PROCEDURE — 3008F BODY MASS INDEX DOCD: CPT | Performed by: FAMILY MEDICINE

## 2023-06-29 PROCEDURE — 99214 OFFICE O/P EST MOD 30 MIN: CPT | Performed by: FAMILY MEDICINE

## 2023-06-29 RX ORDER — METFORMIN HYDROCHLORIDE 500 MG/1
1000 TABLET ORAL 2 TIMES DAILY
Qty: 360 TABLET | Refills: 1 | Status: SHIPPED | OUTPATIENT
Start: 2023-06-29 | End: 2023-12-28 | Stop reason: SDUPTHER

## 2023-06-29 RX ORDER — LISINOPRIL 20 MG/1
20 TABLET ORAL DAILY
Qty: 90 TABLET | Refills: 1 | Status: SHIPPED | OUTPATIENT
Start: 2023-06-29 | End: 2023-12-28 | Stop reason: SDUPTHER

## 2023-06-29 RX ORDER — ALOGLIPTIN 25 MG/1
25 TABLET, FILM COATED ORAL DAILY
Qty: 90 TABLET | Refills: 1 | Status: SHIPPED | OUTPATIENT
Start: 2023-06-29 | End: 2023-12-28 | Stop reason: ALTCHOICE

## 2023-06-29 RX ORDER — INSULIN DEGLUDEC 100 U/ML
INJECTION, SOLUTION SUBCUTANEOUS
Qty: 30 ML | Refills: 3 | Status: SHIPPED | OUTPATIENT
Start: 2023-06-29

## 2023-06-29 RX ORDER — ATORVASTATIN CALCIUM 40 MG/1
40 TABLET, FILM COATED ORAL DAILY
Qty: 90 TABLET | Refills: 1 | Status: SHIPPED | OUTPATIENT
Start: 2023-06-29 | End: 2023-12-28 | Stop reason: SDUPTHER

## 2023-06-29 NOTE — PROGRESS NOTES
"Maxime Marroquin is a 58 y.o. male here today for   Chief Complaint   Patient presents with    Establish Care   Patient is here today to get established.  His previous PCP was Dr. Carlton Thompson from [facility].  He would like to establish here because of convenience.    Diabetes recheck -- Patient feeling well.  No CP, numbness of feet, blurred vision, polyuria.  Trying to follow diet.  No side effects from medications.  No hypoglycemic symptoms.  He takes metformin,   Home glucose on occasion - 245.  Takes Tresiba once daily 36 U daily and two oral meds.   He does not need a refill of the Tresiba.    HLD recheck -- Patient taking medications correctly.  No SE's of muscle pain or joint pain.  No CP, edema, myalgias.  He has no history of heart disease or heart evaluation.    HTN recheck -- Patient denies chest pain, SOB, edema, palpitations on review.  Taking medication correctly and denies any side effects.      He does smoke cigarettes.  He wants to quit but does not want to try medications.  He says he plans to quit cold turkey 1 day but he is not quite ready yet.    Gout recheck -- Patient reports that gout has been stable. There have been no significant flares since last visit. Patient is tolerating medications well with no side effects. Patient denies joint redness, swelling, pain, or nodules.  No daily preventative medication.  Flares about 2-4 times per year.  Tomatoes seem to cause flares.  Colchicine works very well.   Patient reports no refills are needed today.           Current Outpatient Medications:     Easy Touch 31 gauge x 3/16\" needle, USE DAILY AS DIRECTED, Disp: 100 each, Rfl: 3    FreeStyle Lancets 28 gauge, Use to test blood sugar three times daily., Disp: 300 each, Rfl: 3    alogliptin (Nesina) 25 mg tablet, Take 1 tablet (25 mg) by mouth once daily., Disp: 90 tablet, Rfl: 1    atorvastatin (Lipitor) 40 mg tablet, Take 1 tablet (40 mg) by mouth once daily. *EMERGENCY REFILL*, " "Disp: 90 tablet, Rfl: 1    lisinopril 20 mg tablet, Take 1 tablet (20 mg) by mouth once daily., Disp: 90 tablet, Rfl: 1    metFORMIN (Glucophage) 500 mg tablet, Take 2 tablets (1,000 mg) by mouth 2 times a day., Disp: 360 tablet, Rfl: 1    Tresiba FlexTouch U-100 100 unit/mL (3 mL) injection, INJECT 36 UNITS SUBCUTANEOUSLY AT NIGHT, Disp: 30 mL, Rfl: 3    Patient Active Problem List   Diagnosis    Benign essential HTN    Diabetic neuropathy (CMS/HCC)    DJD of AC (acromioclavicular) joint    Epidermal inclusion cyst    Gout    Impingement syndrome of left shoulder    Pure hypercholesterolemia    Class 1 obesity due to excess calories with serious comorbidity and body mass index (BMI) of 33.0 to 33.9 in adult    Hypogonadism in male    Presbyopia    Type 2 diabetes mellitus without complication, with long-term current use of insulin (CMS/HCC)         No results found for this or any previous visit (from the past 2016 hour(s)).     Objective    Visit Vitals  /82   Pulse 74   Temp 36.7 °C (98 °F)   Resp 20   Ht 1.88 m (6' 2\")   Wt 118 kg (260 lb)   BMI 33.38 kg/m²     Body mass index is 33.38 kg/m².     Physical Exam   General - Not in acute distress and cooperative.  Build & Nutrition - Well developed  Posture - Normal  Gait - Normal  Mental Status - alert and oriented x 3    Head - Normocephalic    Neck - Thyroid normal size    Eyes - Bilateral - Sclera clear and lids pink without edema or mass.      Skin - Warm and dry with no rashes on visible skin    Lungs - Clear to auscultation and normal breathing effort    Cardiovascular - RRR and no murmurs, rubs or thrill.    Peripheral Vascular - Bilateral - no edema present    Neuropsychiatric - normal mood and affect        Assessment    1. Chronic gout of multiple sites, unspecified cause     Patient takes colchicine on rare occasion.  He has never been on a preventative medication and his flares are few and far between.  We will continue colchicine as needed " for now.  Patient reports no refills are needed today.     2. Benign essential HTN  lisinopril 20 mg tablet, Comprehensive Metabolic Panel, CBC, Lipid Panel   Condition well controlled.  No change in current treatment regimen.  Refill given of current medication.  Appropriate labs ordered or reviewed.  Make a follow up appointment with me for recheck in 6 months.     3. Pure hypercholesterolemia  atorvastatin (Lipitor) 40 mg tablet, Comprehensive Metabolic Panel, CBC, Lipid Panel   Condition well controlled.  No change in current treatment regimen.  Refill given of current medication.  Appropriate labs ordered or reviewed.  Make a follow up appointment with me for recheck in 6 months.     4. Type 2 diabetes mellitus without complication, with long-term current use of insulin (CMS/[facility])  alogliptin (Nesina) 25 mg tablet, metFORMIN (Glucophage) 500 mg tablet, Tresiba FlexTouch U-100 100 unit/mL (3 mL) injection, Comprehensive Metabolic Panel, CBC, Lipid Panel, Hemoglobin A1C   We will check his A1c.  If he is well controlled then we will plan on following up in 6 months.  No change in current treatment regimen.  Refill given of current medication.     5.   Tobacco use-much advice on tobacco use.  Dangers and increased risks described in detail.  Discussed how and why to quit.  Much advice on quitting, OTC aids and ways to increase likelihood of quitting.

## 2023-12-28 ENCOUNTER — OFFICE VISIT (OUTPATIENT)
Dept: PRIMARY CARE | Facility: CLINIC | Age: 58
End: 2023-12-28
Payer: COMMERCIAL

## 2023-12-28 VITALS
SYSTOLIC BLOOD PRESSURE: 132 MMHG | RESPIRATION RATE: 16 BRPM | BODY MASS INDEX: 31.83 KG/M2 | TEMPERATURE: 97.8 F | HEIGHT: 74 IN | DIASTOLIC BLOOD PRESSURE: 82 MMHG | HEART RATE: 68 BPM | WEIGHT: 248 LBS

## 2023-12-28 DIAGNOSIS — M1A.09X0 CHRONIC GOUT OF MULTIPLE SITES, UNSPECIFIED CAUSE: Primary | ICD-10-CM

## 2023-12-28 DIAGNOSIS — E78.00 PURE HYPERCHOLESTEROLEMIA: ICD-10-CM

## 2023-12-28 DIAGNOSIS — E11.9 TYPE 2 DIABETES MELLITUS WITHOUT COMPLICATION, WITH LONG-TERM CURRENT USE OF INSULIN (MULTI): ICD-10-CM

## 2023-12-28 DIAGNOSIS — I10 BENIGN ESSENTIAL HTN: ICD-10-CM

## 2023-12-28 DIAGNOSIS — E11.42 DIABETIC POLYNEUROPATHY ASSOCIATED WITH TYPE 2 DIABETES MELLITUS (MULTI): ICD-10-CM

## 2023-12-28 DIAGNOSIS — Z79.4 TYPE 2 DIABETES MELLITUS WITHOUT COMPLICATION, WITH LONG-TERM CURRENT USE OF INSULIN (MULTI): ICD-10-CM

## 2023-12-28 DIAGNOSIS — H65.01 NON-RECURRENT ACUTE SEROUS OTITIS MEDIA OF RIGHT EAR: ICD-10-CM

## 2023-12-28 LAB — POC HEMOGLOBIN A1C: 12.5 % (ref 4.2–6.5)

## 2023-12-28 PROCEDURE — 3008F BODY MASS INDEX DOCD: CPT | Performed by: FAMILY MEDICINE

## 2023-12-28 PROCEDURE — 4004F PT TOBACCO SCREEN RCVD TLK: CPT | Performed by: FAMILY MEDICINE

## 2023-12-28 PROCEDURE — 83036 HEMOGLOBIN GLYCOSYLATED A1C: CPT | Performed by: FAMILY MEDICINE

## 2023-12-28 PROCEDURE — 4010F ACE/ARB THERAPY RXD/TAKEN: CPT | Performed by: FAMILY MEDICINE

## 2023-12-28 PROCEDURE — 3075F SYST BP GE 130 - 139MM HG: CPT | Performed by: FAMILY MEDICINE

## 2023-12-28 PROCEDURE — 3079F DIAST BP 80-89 MM HG: CPT | Performed by: FAMILY MEDICINE

## 2023-12-28 PROCEDURE — 99215 OFFICE O/P EST HI 40 MIN: CPT | Performed by: FAMILY MEDICINE

## 2023-12-28 RX ORDER — ATORVASTATIN CALCIUM 40 MG/1
40 TABLET, FILM COATED ORAL DAILY
Qty: 90 TABLET | Refills: 1 | Status: SHIPPED | OUTPATIENT
Start: 2023-12-28

## 2023-12-28 RX ORDER — METFORMIN HYDROCHLORIDE 500 MG/1
1000 TABLET ORAL 2 TIMES DAILY
Qty: 360 TABLET | Refills: 1 | Status: SHIPPED | OUTPATIENT
Start: 2023-12-28

## 2023-12-28 RX ORDER — LISINOPRIL 20 MG/1
20 TABLET ORAL DAILY
Qty: 90 TABLET | Refills: 1 | Status: SHIPPED | OUTPATIENT
Start: 2023-12-28

## 2023-12-28 RX ORDER — CEFDINIR 300 MG/1
300 CAPSULE ORAL 2 TIMES DAILY
Qty: 20 CAPSULE | Refills: 0 | Status: SHIPPED | OUTPATIENT
Start: 2023-12-28 | End: 2024-01-07

## 2023-12-28 ASSESSMENT — ENCOUNTER SYMPTOMS
HYPERTENSION: 1
SINUS PRESSURE: 1
SINUS COMPLAINT: 1

## 2023-12-28 NOTE — PROGRESS NOTES
Maxime Marroquin is a 58 y.o. male here today for   Chief Complaint   Patient presents with    Hyperlipidemia    Gout    Diabetes    Hypertension    Sinus Problem        Hyperlipidemia  This is a chronic problem. The current episode started more than 1 year ago.   Diabetes  He presents for his follow-up diabetic visit. He has type 2 diabetes mellitus. His breakfast blood glucose range is generally 180-200 mg/dl.   Hypertension  This is a chronic problem. The current episode started more than 1 year ago.   Sinus Problem  This is a new problem. The current episode started in the past 7 days. Associated symptoms include congestion and sinus pressure.      Gout recheck -- Patient reports that gout has been stable. There have been no significant flares since last visit.  He is not taking chronic medications.  Patient denies joint redness, swelling, pain, or nodules.      HTN recheck -- Patient denies chest pain, SOB, edema, palpitations on review.  Taking medication correctly and denies any side effects.    HLD recheck -- Patient taking medications correctly.  No SE's of muscle pain or joint pain.  No CP, edema, myalgias.    Diabetes recheck -- Patient feeling well.  No CP, numbness of feet, blurred vision, polyuria.  Trying to follow diet.  No side effects from medications.  No hypoglycemic symptoms. Home readings a little high over the holidays -he says occasionally in the 300s..  He takes Tresiba at bedtime but only about half the time - if glucose is good he does not take it.  His A1c has increased greatly today and is 12.5 in the office.  He is also lost about 12 pounds since his last visit even though he says he is eating a lot.    Sinus problems -- He has had sinus pressure and right ear pressure for one week.  Not very congested.  No cough or fever.      He forgot to get labs because busy with wife - she has lung cancer stage 4 and brain tumor - but she has been stable.      Tobacco use - he has not quit yet.  But  "is thinking about it.        Current Outpatient Medications:     Easy Touch 31 gauge x 3/16\" needle, USE DAILY AS DIRECTED, Disp: 100 each, Rfl: 3    FreeStyle Lancets 28 gauge, Use to test blood sugar three times daily., Disp: 300 each, Rfl: 3    Tresiba FlexTouch U-100 100 unit/mL (3 mL) injection, INJECT 36 UNITS SUBCUTANEOUSLY AT NIGHT, Disp: 30 mL, Rfl: 3    atorvastatin (Lipitor) 40 mg tablet, Take 1 tablet (40 mg) by mouth once daily. *EMERGENCY REFILL*, Disp: 90 tablet, Rfl: 1    cefdinir (Omnicef) 300 mg capsule, Take 1 capsule (300 mg) by mouth 2 times a day for 10 days., Disp: 20 capsule, Rfl: 0    lisinopril 20 mg tablet, Take 1 tablet (20 mg) by mouth once daily., Disp: 90 tablet, Rfl: 1    metFORMIN (Glucophage) 500 mg tablet, Take 2 tablets (1,000 mg) by mouth 2 times a day., Disp: 360 tablet, Rfl: 1    Patient Active Problem List   Diagnosis    Benign essential HTN    Diabetic neuropathy (CMS/HCC)    DJD of AC (acromioclavicular) joint    Epidermal inclusion cyst    Gout    Impingement syndrome of left shoulder    Pure hypercholesterolemia    Class 1 obesity due to excess calories with serious comorbidity and body mass index (BMI) of 33.0 to 33.9 in adult    Hypogonadism in male    Presbyopia    Type 2 diabetes mellitus without complication, with long-term current use of insulin (CMS/HCC)    Tobacco use         Recent Results (from the past 672 hour(s))   POCT glycosylated hemoglobin (Hb A1C) manually resulted    Collection Time: 12/28/23  8:03 AM   Result Value Ref Range    POC HEMOGLOBIN A1c 12.5 (A) 4.2 - 6.5 %        Objective    Visit Vitals  /82   Pulse 68   Temp 36.6 °C (97.8 °F)   Resp 16   Ht 1.88 m (6' 2\")   Wt 112 kg (248 lb)   BMI 31.84 kg/m²     Body mass index is 31.84 kg/m².     Physical Exam   General - Not in acute distress and cooperative.  Build & Nutrition - Well developed  Posture - Normal  Gait - Normal  Mental Status - alert and oriented x 3    Head - " Normocephalic    Neck - Thyroid normal size    Eyes - Bilateral - Sclera clear and lids pink without edema or mass.      Skin - Warm and dry with no rashes on visible skin    Lungs - Clear to auscultation and normal breathing effort    Cardiovascular - RRR and no murmurs, rubs or thrill.    Peripheral Vascular - Bilateral - no edema present    Neuropsychiatric - normal mood and affect    Ears-right tympanic membrane is dull and pink diffusely.    Sinuses-nontender    Assessment    1. Chronic gout of multiple sites, unspecified cause     This has been stable and he does not take any chronic medicines.  He does not need a refill of colchicine at this time.     2. Type 2 diabetes mellitus without complication, with long-term current use of insulin (CMS/[facility])  POCT glycosylated hemoglobin (Hb A1C) manually resulted, metFORMIN (Glucophage) 500 mg tablet, CBC, Comprehensive Metabolic Panel, Lipid Panel, Albumin , Urine Random   This is currently very poorly controlled with an A1c of 12.5.  We discussed the dangers of poorly controlled diabetes and how it increases his risk greatly for many different conditions.  He has not been taking the Tresiba correctly and has only been taking it about half the time.  I explained to him that Tresiba needs to be taken every day even if his glucose readings are good and I explained its pharmacodynamics.  At this point we are not going to change his medicines but I want him to take the Tresiba every day as prescribed and he will continue with metformin.  We will recheck in 3 months to see how his A1c is doing.  He has probably been losing weight because of very poorly controlled diabetes and I explained this to him.     3. Pure hypercholesterolemia  atorvastatin (Lipitor) 40 mg tablet, CBC, Comprehensive Metabolic Panel, Lipid Panel, Albumin , Urine Random   Condition well controlled.  No change in current treatment regimen.  Refill given of current medication.  Appropriate labs ordered  or reviewed.  Make a follow up appointment with me for recheck in 6 months.       4. Benign essential HTN  lisinopril 20 mg tablet, CBC, Comprehensive Metabolic Panel, Lipid Panel, Albumin , Urine Random   Condition well controlled.  No change in current treatment regimen.  Refill given of current medication.  Appropriate labs ordered or reviewed.  Make a follow up appointment with me for recheck in 6 months.       5. Non-recurrent acute serous otitis media of right ear  cefdinir (Omnicef) 300 mg capsule   He has a right otitis media and we will treat with cefdinir.  Call or RTO if symptoms worsen or don't fully resolve.  Increase fluid intake.  Rest.  May use OTC symptomatic medications as needed at the appropriate dose.

## 2024-04-02 ENCOUNTER — APPOINTMENT (OUTPATIENT)
Dept: PRIMARY CARE | Facility: CLINIC | Age: 59
End: 2024-04-02
Payer: COMMERCIAL

## 2024-04-10 ENCOUNTER — LAB (OUTPATIENT)
Dept: LAB | Facility: LAB | Age: 59
End: 2024-04-10
Payer: COMMERCIAL

## 2024-04-10 DIAGNOSIS — Z79.4 TYPE 2 DIABETES MELLITUS WITHOUT COMPLICATION, WITH LONG-TERM CURRENT USE OF INSULIN (MULTI): ICD-10-CM

## 2024-04-10 DIAGNOSIS — I10 BENIGN ESSENTIAL HTN: ICD-10-CM

## 2024-04-10 DIAGNOSIS — E11.9 TYPE 2 DIABETES MELLITUS WITHOUT COMPLICATION, WITH LONG-TERM CURRENT USE OF INSULIN (MULTI): ICD-10-CM

## 2024-04-10 DIAGNOSIS — E78.00 PURE HYPERCHOLESTEROLEMIA: ICD-10-CM

## 2024-04-10 LAB
ALBUMIN SERPL BCP-MCNC: 4.5 G/DL (ref 3.4–5)
ALP SERPL-CCNC: 80 U/L (ref 33–136)
ALT SERPL W P-5'-P-CCNC: 13 U/L (ref 10–52)
ANION GAP SERPL CALC-SCNC: 12 MMOL/L (ref 10–20)
AST SERPL W P-5'-P-CCNC: 9 U/L (ref 9–39)
BILIRUB SERPL-MCNC: 0.6 MG/DL (ref 0–1.2)
BUN SERPL-MCNC: 12 MG/DL (ref 6–23)
CALCIUM SERPL-MCNC: 9.2 MG/DL (ref 8.6–10.3)
CHLORIDE SERPL-SCNC: 103 MMOL/L (ref 98–107)
CHOLEST SERPL-MCNC: 103 MG/DL (ref 0–199)
CHOLESTEROL/HDL RATIO: 3.4
CO2 SERPL-SCNC: 25 MMOL/L (ref 21–32)
CREAT SERPL-MCNC: 0.8 MG/DL (ref 0.5–1.3)
EGFRCR SERPLBLD CKD-EPI 2021: >90 ML/MIN/1.73M*2
ERYTHROCYTE [DISTWIDTH] IN BLOOD BY AUTOMATED COUNT: 13.6 % (ref 11.5–14.5)
EST. AVERAGE GLUCOSE BLD GHB EST-MCNC: 303 MG/DL
GLUCOSE SERPL-MCNC: 286 MG/DL (ref 74–99)
HBA1C MFR BLD: 12.2 %
HCT VFR BLD AUTO: 42.3 % (ref 41–52)
HDLC SERPL-MCNC: 30.3 MG/DL
HGB BLD-MCNC: 14.3 G/DL (ref 13.5–17.5)
LDLC SERPL CALC-MCNC: 52 MG/DL
MCH RBC QN AUTO: 30.4 PG (ref 26–34)
MCHC RBC AUTO-ENTMCNC: 33.8 G/DL (ref 32–36)
MCV RBC AUTO: 90 FL (ref 80–100)
NON HDL CHOLESTEROL: 73 MG/DL (ref 0–149)
NRBC BLD-RTO: 0 /100 WBCS (ref 0–0)
PLATELET # BLD AUTO: 257 X10*3/UL (ref 150–450)
POTASSIUM SERPL-SCNC: 4.5 MMOL/L (ref 3.5–5.3)
PROT SERPL-MCNC: 6.6 G/DL (ref 6.4–8.2)
RBC # BLD AUTO: 4.7 X10*6/UL (ref 4.5–5.9)
SODIUM SERPL-SCNC: 135 MMOL/L (ref 136–145)
TRIGL SERPL-MCNC: 104 MG/DL (ref 0–149)
VLDL: 21 MG/DL (ref 0–40)
WBC # BLD AUTO: 9.2 X10*3/UL (ref 4.4–11.3)

## 2024-04-10 PROCEDURE — 85027 COMPLETE CBC AUTOMATED: CPT

## 2024-04-10 PROCEDURE — 80061 LIPID PANEL: CPT

## 2024-04-10 PROCEDURE — 83036 HEMOGLOBIN GLYCOSYLATED A1C: CPT

## 2024-04-10 PROCEDURE — 36415 COLL VENOUS BLD VENIPUNCTURE: CPT

## 2024-04-10 PROCEDURE — 80053 COMPREHEN METABOLIC PANEL: CPT

## 2024-04-11 ENCOUNTER — OFFICE VISIT (OUTPATIENT)
Dept: PRIMARY CARE | Facility: CLINIC | Age: 59
End: 2024-04-11
Payer: COMMERCIAL

## 2024-04-11 VITALS
SYSTOLIC BLOOD PRESSURE: 130 MMHG | RESPIRATION RATE: 20 BRPM | HEART RATE: 60 BPM | DIASTOLIC BLOOD PRESSURE: 78 MMHG | TEMPERATURE: 97 F | BODY MASS INDEX: 31.95 KG/M2 | WEIGHT: 249 LBS | HEIGHT: 74 IN

## 2024-04-11 DIAGNOSIS — I10 BENIGN ESSENTIAL HTN: ICD-10-CM

## 2024-04-11 DIAGNOSIS — E78.00 PURE HYPERCHOLESTEROLEMIA: ICD-10-CM

## 2024-04-11 DIAGNOSIS — Z79.4 TYPE 2 DIABETES MELLITUS WITHOUT COMPLICATION, WITH LONG-TERM CURRENT USE OF INSULIN (MULTI): Primary | ICD-10-CM

## 2024-04-11 DIAGNOSIS — E11.9 TYPE 2 DIABETES MELLITUS WITHOUT COMPLICATION, WITH LONG-TERM CURRENT USE OF INSULIN (MULTI): Primary | ICD-10-CM

## 2024-04-11 PROCEDURE — 3078F DIAST BP <80 MM HG: CPT | Performed by: FAMILY MEDICINE

## 2024-04-11 PROCEDURE — 3048F LDL-C <100 MG/DL: CPT | Performed by: FAMILY MEDICINE

## 2024-04-11 PROCEDURE — 4010F ACE/ARB THERAPY RXD/TAKEN: CPT | Performed by: FAMILY MEDICINE

## 2024-04-11 PROCEDURE — 3075F SYST BP GE 130 - 139MM HG: CPT | Performed by: FAMILY MEDICINE

## 2024-04-11 PROCEDURE — 3046F HEMOGLOBIN A1C LEVEL >9.0%: CPT | Performed by: FAMILY MEDICINE

## 2024-04-11 PROCEDURE — 99214 OFFICE O/P EST MOD 30 MIN: CPT | Performed by: FAMILY MEDICINE

## 2024-04-11 PROCEDURE — 3008F BODY MASS INDEX DOCD: CPT | Performed by: FAMILY MEDICINE

## 2024-04-11 NOTE — PROGRESS NOTES
"Maxime Marroquin is a 59 y.o. male here today for   Chief Complaint   Patient presents with    Diabetes        Diabetes  He presents for his follow-up diabetic visit. He has type 2 diabetes mellitus. His breakfast blood glucose range is generally >200 mg/dl.      Diabetes recheck -- Patient feeling well.  No CP, numbness of feet, blurred vision, polyuria.  Trying to follow diet.  No side effects from medications.  No hypoglycemic symptoms.  His A1c is still very high at 12.2.  It was 12.5 at his last visit 3 months ago.  He is taking Tresiba every night.  He is noticing some polyuria and polydipsia at times.      HTN recheck -- Patient denies chest pain, SOB, edema, palpitations on review.  Taking medication correctly and denies any side effects.    HLD recheck -- Patient taking medications correctly.  No SE's of muscle pain or joint pain.  No CP, edema, myalgias.      Current Outpatient Medications:     atorvastatin (Lipitor) 40 mg tablet, Take 1 tablet (40 mg) by mouth once daily. *EMERGENCY REFILL*, Disp: 90 tablet, Rfl: 1    Easy Touch 31 gauge x 3/16\" needle, USE DAILY AS DIRECTED, Disp: 100 each, Rfl: 3    FreeStyle Lancets 28 gauge, Use to test blood sugar three times daily., Disp: 300 each, Rfl: 3    lisinopril 20 mg tablet, Take 1 tablet (20 mg) by mouth once daily., Disp: 90 tablet, Rfl: 1    metFORMIN (Glucophage) 500 mg tablet, Take 2 tablets (1,000 mg) by mouth 2 times a day., Disp: 360 tablet, Rfl: 1    Tresiba FlexTouch U-100 100 unit/mL (3 mL) injection, INJECT 36 UNITS SUBCUTANEOUSLY AT NIGHT, Disp: 30 mL, Rfl: 3    empagliflozin (Jardiance) 25 mg, Take 1 tablet (25 mg) by mouth once daily., Disp: 90 tablet, Rfl: 1    Patient Active Problem List   Diagnosis    Benign essential HTN    Diabetic neuropathy (CMS/HCC)    DJD of AC (acromioclavicular) joint    Epidermal inclusion cyst    Gout    Impingement syndrome of left shoulder    Pure hypercholesterolemia    Class 1 obesity due to excess calories with " "serious comorbidity and body mass index (BMI) of 33.0 to 33.9 in adult    Hypogonadism in male    Presbyopia    Type 2 diabetes mellitus without complication, with long-term current use of insulin (CMS/[facility])    Tobacco use         Recent Results (from the past 672 hour(s))   Comprehensive Metabolic Panel    Collection Time: 04/10/24  7:00 AM   Result Value Ref Range    Glucose 286 (H) 74 - 99 mg/dL    Sodium 135 (L) 136 - 145 mmol/L    Potassium 4.5 3.5 - 5.3 mmol/L    Chloride 103 98 - 107 mmol/L    Bicarbonate 25 21 - 32 mmol/L    Anion Gap 12 10 - 20 mmol/L    Urea Nitrogen 12 6 - 23 mg/dL    Creatinine 0.80 0.50 - 1.30 mg/dL    eGFR >90 >60 mL/min/1.73m*2    Calcium 9.2 8.6 - 10.3 mg/dL    Albumin 4.5 3.4 - 5.0 g/dL    Alkaline Phosphatase 80 33 - 136 U/L    Total Protein 6.6 6.4 - 8.2 g/dL    AST 9 9 - 39 U/L    Bilirubin, Total 0.6 0.0 - 1.2 mg/dL    ALT 13 10 - 52 U/L   CBC    Collection Time: 04/10/24  7:00 AM   Result Value Ref Range    WBC 9.2 4.4 - 11.3 x10*3/uL    nRBC 0.0 0.0 - 0.0 /100 WBCs    RBC 4.70 4.50 - 5.90 x10*6/uL    Hemoglobin 14.3 13.5 - 17.5 g/dL    Hematocrit 42.3 41.0 - 52.0 %    MCV 90 80 - 100 fL    MCH 30.4 26.0 - 34.0 pg    MCHC 33.8 32.0 - 36.0 g/dL    RDW 13.6 11.5 - 14.5 %    Platelets 257 150 - 450 x10*3/uL   Lipid Panel    Collection Time: 04/10/24  7:00 AM   Result Value Ref Range    Cholesterol 103 0 - 199 mg/dL    HDL-Cholesterol 30.3 mg/dL    Cholesterol/HDL Ratio 3.4     LDL Calculated 52 <=99 mg/dL    VLDL 21 0 - 40 mg/dL    Triglycerides 104 0 - 149 mg/dL    Non HDL Cholesterol 73 0 - 149 mg/dL   Hemoglobin A1C    Collection Time: 04/10/24  7:00 AM   Result Value Ref Range    Hemoglobin A1C 12.2 (H) see below %    Estimated Average Glucose 303 Not Established mg/dL        Objective    Visit Vitals    Visit Vitals  /78   Pulse 60   Temp 36.1 °C (97 °F)   Resp 20   Ht 1.88 m (6' 2\")   Wt 113 kg (249 lb)   BMI 31.97 kg/m²   Smoking Status Every Day   BSA 2.43 m² "       Body mass index is 31.97 kg/m².     Physical Exam   General - Not in acute distress and cooperative.  Build & Nutrition - Well developed  Posture - Normal  Gait - Normal  Mental Status - alert and oriented x 3    Head - Normocephalic    Neck - Thyroid normal size    Eyes - Bilateral - Sclera clear and lids pink without edema or mass.      Skin - Warm and dry with no rashes on visible skin    Lungs - Clear to auscultation and normal breathing effort    Cardiovascular - RRR and no murmurs, rubs or thrill.    Peripheral Vascular - Bilateral - no edema present    Neuropsychiatric - normal mood and affect        Assessment    1. Type 2 diabetes mellitus without complication, with long-term current use of insulin (CMS/[facility])  empagliflozin (Jardiance) 25 mg   His A1c is still very high at 12.2.  We are going to add Jardiance 25 mg once daily.  He will continue Tresiba and metformin at the same dose.  We will follow-up in 3 months for recheck.  We discussed his diet and food choices.     2. Pure hypercholesterolemia     Condition well controlled.  No change in current treatment regimen.  Refill given of current medication.  Appropriate labs ordered or reviewed.  Make a follow up appointment with me for recheck in 6 months.       3. Benign essential HTN     Condition well controlled.  No change in current treatment regimen.  Refill given of current medication.  Appropriate labs ordered or reviewed.  Make a follow up appointment with me for recheck in 6 months.             Orders Placed This Encounter      empagliflozin (Jardiance) 25 mg       No orders of the defined types were placed in this encounter.       New Medications Ordered This Visit   Medications    empagliflozin (Jardiance) 25 mg     Sig: Take 1 tablet (25 mg) by mouth once daily.     Dispense:  90 tablet     Refill:  1

## 2024-07-12 ENCOUNTER — APPOINTMENT (OUTPATIENT)
Dept: PRIMARY CARE | Facility: CLINIC | Age: 59
End: 2024-07-12
Payer: COMMERCIAL

## 2024-07-26 ENCOUNTER — APPOINTMENT (OUTPATIENT)
Dept: PRIMARY CARE | Facility: CLINIC | Age: 59
End: 2024-07-26
Payer: COMMERCIAL

## 2024-07-26 VITALS
RESPIRATION RATE: 20 BRPM | DIASTOLIC BLOOD PRESSURE: 82 MMHG | SYSTOLIC BLOOD PRESSURE: 128 MMHG | WEIGHT: 235 LBS | HEART RATE: 68 BPM | HEIGHT: 74 IN | BODY MASS INDEX: 30.16 KG/M2 | TEMPERATURE: 97 F

## 2024-07-26 DIAGNOSIS — E11.9 TYPE 2 DIABETES MELLITUS WITHOUT COMPLICATION, WITH LONG-TERM CURRENT USE OF INSULIN (MULTI): ICD-10-CM

## 2024-07-26 DIAGNOSIS — Z79.4 TYPE 2 DIABETES MELLITUS WITHOUT COMPLICATION, WITH LONG-TERM CURRENT USE OF INSULIN (MULTI): ICD-10-CM

## 2024-07-26 DIAGNOSIS — I10 BENIGN ESSENTIAL HTN: ICD-10-CM

## 2024-07-26 DIAGNOSIS — E78.00 PURE HYPERCHOLESTEROLEMIA: ICD-10-CM

## 2024-07-26 LAB — POC HEMOGLOBIN A1C: 12.5 % (ref 4.2–6.5)

## 2024-07-26 PROCEDURE — 3074F SYST BP LT 130 MM HG: CPT | Performed by: FAMILY MEDICINE

## 2024-07-26 PROCEDURE — 83036 HEMOGLOBIN GLYCOSYLATED A1C: CPT | Performed by: FAMILY MEDICINE

## 2024-07-26 PROCEDURE — 99214 OFFICE O/P EST MOD 30 MIN: CPT | Performed by: FAMILY MEDICINE

## 2024-07-26 PROCEDURE — 3008F BODY MASS INDEX DOCD: CPT | Performed by: FAMILY MEDICINE

## 2024-07-26 PROCEDURE — 3048F LDL-C <100 MG/DL: CPT | Performed by: FAMILY MEDICINE

## 2024-07-26 PROCEDURE — 3046F HEMOGLOBIN A1C LEVEL >9.0%: CPT | Performed by: FAMILY MEDICINE

## 2024-07-26 PROCEDURE — 4010F ACE/ARB THERAPY RXD/TAKEN: CPT | Performed by: FAMILY MEDICINE

## 2024-07-26 PROCEDURE — 3079F DIAST BP 80-89 MM HG: CPT | Performed by: FAMILY MEDICINE

## 2024-07-26 RX ORDER — METFORMIN HYDROCHLORIDE 500 MG/1
1000 TABLET ORAL 2 TIMES DAILY
Qty: 360 TABLET | Refills: 1 | Status: SHIPPED | OUTPATIENT
Start: 2024-07-26

## 2024-07-26 RX ORDER — ATORVASTATIN CALCIUM 40 MG/1
40 TABLET, FILM COATED ORAL DAILY
Qty: 90 TABLET | Refills: 1 | Status: SHIPPED | OUTPATIENT
Start: 2024-07-26

## 2024-07-26 RX ORDER — LISINOPRIL 20 MG/1
20 TABLET ORAL DAILY
Qty: 90 TABLET | Refills: 1 | Status: SHIPPED | OUTPATIENT
Start: 2024-07-26

## 2024-07-26 RX ORDER — INSULIN DEGLUDEC 100 U/ML
INJECTION, SOLUTION SUBCUTANEOUS
Qty: 30 ML | Refills: 3 | Status: SHIPPED | OUTPATIENT
Start: 2024-07-26

## 2024-07-26 NOTE — PROGRESS NOTES
"Maxime Marroquin is a 59 y.o. male here today for   Chief Complaint   Patient presents with    Diabetes        Diabetes  He presents for his follow-up diabetic visit. He has type 2 diabetes mellitus. His breakfast blood glucose range is generally >200 mg/dl.      Diabetes recheck -- Patient feeling well.  No CP, numbness of feet, blurred vision, polyuria.  Trying to follow diet.  No side effects from medications.  No hypoglycemic symptoms.  He never started Jardiance bc too expensive.  He says it would have been over $600 and he cannot afford this.  He never called us to inform us of this so he has continued his medications the same.  A1C still very high at 12.5 today.      HTN recheck -- Patient denies chest pain, SOB, edema, palpitations on review.  Taking medication correctly and denies any side effects.            Current Outpatient Medications:     Easy Touch 31 gauge x 3/16\" needle, USE DAILY AS DIRECTED, Disp: 100 each, Rfl: 3    FreeStyle Lancets 28 gauge, Use to test blood sugar three times daily., Disp: 300 each, Rfl: 3    atorvastatin (Lipitor) 40 mg tablet, Take 1 tablet (40 mg) by mouth once daily. *EMERGENCY REFILL*, Disp: 90 tablet, Rfl: 1    lisinopril 20 mg tablet, Take 1 tablet (20 mg) by mouth once daily., Disp: 90 tablet, Rfl: 1    metFORMIN (Glucophage) 500 mg tablet, Take 2 tablets (1,000 mg) by mouth 2 times a day., Disp: 360 tablet, Rfl: 1    Tresiba FlexTouch U-100 100 unit/mL (3 mL) injection, INJECT 50 UNITS SUBCUTANEOUSLY AT NIGHT, Disp: 30 mL, Rfl: 3    Patient Active Problem List   Diagnosis    Benign essential HTN    Diabetic neuropathy (Multi)    DJD of AC (acromioclavicular) joint    Epidermal inclusion cyst    Gout    Impingement syndrome of left shoulder    Pure hypercholesterolemia    Class 1 obesity due to excess calories with serious comorbidity and body mass index (BMI) of 33.0 to 33.9 in adult    Hypogonadism in male    Presbyopia    Type 2 diabetes mellitus without " "complication, with long-term current use of insulin (Multi)    Tobacco use         Recent Results (from the past 672 hour(s))   POCT glycosylated hemoglobin (Hb A1C) manually resulted    Collection Time: 07/26/24  7:58 AM   Result Value Ref Range    POC HEMOGLOBIN A1c 12.5 (A) 4.2 - 6.5 %        Objective    Visit Vitals    Visit Vitals  /82   Pulse 68   Temp 36.1 °C (97 °F)   Resp 20   Ht 1.88 m (6' 2\")   Wt 107 kg (235 lb)   BMI 30.17 kg/m²   Smoking Status Every Day   BSA 2.36 m²       Body mass index is 30.17 kg/m².     Physical Exam   General - Not in acute distress and cooperative.  Build & Nutrition - Well developed  Posture - Normal  Gait - Normal  Mental Status - alert and oriented x 3    Head - Normocephalic    Eyes - Bilateral - Sclera clear and lids pink without edema or mass.      Skin - Warm and dry with no rashes on visible skin    Neuropsychiatric - normal mood and affect, well groomed and good eye contact.  Able to articulate well with normal speech/language, rate and coherence.  Associations are intact.  No evidence of hallucinations, delusions, obsessions or homicidal/suicidal ideation.  Attention span and ability to concentrate are normal.    Assessment    1. Type 2 diabetes mellitus without complication, with long-term current use of insulin (Multi)  POCT glycosylated hemoglobin (Hb A1C) manually resulted, Tresiba FlexTouch U-100 100 unit/mL (3 mL) injection, metFORMIN (Glucophage) 500 mg tablet   Patient I discussed the need to lower his glucose readings and A1c.  We discussed the consequences of poorly controlled diabetes such as heart disease, blindness, kidney damage, peripheral vascular disease.  We are going to increase his Tresiba up to 50 units daily.  He is currently taking 36 units daily.  His insurance did not cover Jardiance well and it was far too expensive.  We will follow-up in 3 months for recheck.  He will continue the same metformin dose.     2. Benign essential HTN  " lisinopril 20 mg tablet   Condition seems well controlled.  No change in current treatment.       3. Pure hypercholesterolemia  atorvastatin (Lipitor) 40 mg tablet   Condition seems well controlled.  No change in current treatment.             Orders Placed This Encounter      atorvastatin (Lipitor) 40 mg tablet      lisinopril 20 mg tablet      metFORMIN (Glucophage) 500 mg tablet      Tresiba FlexTouch U-100 100 unit/mL (3 mL) injection       Orders Placed This Encounter   Procedures    POCT glycosylated hemoglobin (Hb A1C) manually resulted        New Medications Ordered This Visit   Medications    Tresiba FlexTouch U-100 100 unit/mL (3 mL) injection     Sig: INJECT 50 UNITS SUBCUTANEOUSLY AT NIGHT     Dispense:  30 mL     Refill:  3    metFORMIN (Glucophage) 500 mg tablet     Sig: Take 2 tablets (1,000 mg) by mouth 2 times a day.     Dispense:  360 tablet     Refill:  1    lisinopril 20 mg tablet     Sig: Take 1 tablet (20 mg) by mouth once daily.     Dispense:  90 tablet     Refill:  1    atorvastatin (Lipitor) 40 mg tablet     Sig: Take 1 tablet (40 mg) by mouth once daily. *EMERGENCY REFILL*     Dispense:  90 tablet     Refill:  1

## 2024-10-31 ENCOUNTER — APPOINTMENT (OUTPATIENT)
Dept: PRIMARY CARE | Facility: CLINIC | Age: 59
End: 2024-10-31
Payer: COMMERCIAL

## 2024-12-27 DIAGNOSIS — E11.9 TYPE 2 DIABETES MELLITUS WITHOUT COMPLICATION, WITH LONG-TERM CURRENT USE OF INSULIN (MULTI): ICD-10-CM

## 2024-12-27 DIAGNOSIS — E78.00 PURE HYPERCHOLESTEROLEMIA: ICD-10-CM

## 2024-12-27 DIAGNOSIS — I10 BENIGN ESSENTIAL HTN: ICD-10-CM

## 2024-12-27 DIAGNOSIS — Z79.4 TYPE 2 DIABETES MELLITUS WITHOUT COMPLICATION, WITH LONG-TERM CURRENT USE OF INSULIN (MULTI): ICD-10-CM

## 2024-12-30 RX ORDER — METFORMIN HYDROCHLORIDE 500 MG/1
1000 TABLET ORAL 2 TIMES DAILY
Qty: 120 TABLET | Refills: 0 | Status: SHIPPED | OUTPATIENT
Start: 2024-12-30

## 2024-12-30 RX ORDER — LISINOPRIL 20 MG/1
20 TABLET ORAL DAILY
Qty: 30 TABLET | Refills: 0 | Status: SHIPPED | OUTPATIENT
Start: 2024-12-30

## 2024-12-30 RX ORDER — ATORVASTATIN CALCIUM 40 MG/1
40 TABLET, FILM COATED ORAL DAILY
Qty: 30 TABLET | Refills: 0 | Status: SHIPPED | OUTPATIENT
Start: 2024-12-30

## 2025-01-28 DIAGNOSIS — Z79.4 TYPE 2 DIABETES MELLITUS WITHOUT COMPLICATION, WITH LONG-TERM CURRENT USE OF INSULIN (MULTI): ICD-10-CM

## 2025-01-28 DIAGNOSIS — E11.9 TYPE 2 DIABETES MELLITUS WITHOUT COMPLICATION, WITH LONG-TERM CURRENT USE OF INSULIN (MULTI): ICD-10-CM

## 2025-01-28 DIAGNOSIS — E78.00 PURE HYPERCHOLESTEROLEMIA: ICD-10-CM

## 2025-01-29 RX ORDER — METFORMIN HYDROCHLORIDE 500 MG/1
1000 TABLET ORAL 2 TIMES DAILY
Qty: 360 TABLET | Refills: 0 | Status: SHIPPED | OUTPATIENT
Start: 2025-01-29

## 2025-01-29 RX ORDER — ATORVASTATIN CALCIUM 40 MG/1
40 TABLET, FILM COATED ORAL DAILY
Qty: 90 TABLET | Refills: 0 | Status: SHIPPED | OUTPATIENT
Start: 2025-01-29

## 2025-01-30 DIAGNOSIS — I10 BENIGN ESSENTIAL HTN: ICD-10-CM

## 2025-01-31 RX ORDER — LISINOPRIL 20 MG/1
20 TABLET ORAL DAILY
Qty: 30 TABLET | Refills: 1 | Status: SHIPPED | OUTPATIENT
Start: 2025-01-31

## 2025-03-04 ENCOUNTER — APPOINTMENT (OUTPATIENT)
Dept: PRIMARY CARE | Facility: CLINIC | Age: 60
End: 2025-03-04
Payer: COMMERCIAL

## 2025-03-04 VITALS
WEIGHT: 236 LBS | HEIGHT: 74 IN | RESPIRATION RATE: 18 BRPM | TEMPERATURE: 97 F | SYSTOLIC BLOOD PRESSURE: 136 MMHG | BODY MASS INDEX: 30.29 KG/M2 | HEART RATE: 78 BPM | DIASTOLIC BLOOD PRESSURE: 88 MMHG

## 2025-03-04 DIAGNOSIS — E78.00 PURE HYPERCHOLESTEROLEMIA: ICD-10-CM

## 2025-03-04 DIAGNOSIS — E11.9 TYPE 2 DIABETES MELLITUS WITHOUT COMPLICATION, WITH LONG-TERM CURRENT USE OF INSULIN (MULTI): ICD-10-CM

## 2025-03-04 DIAGNOSIS — Z79.4 TYPE 2 DIABETES MELLITUS WITHOUT COMPLICATION, WITH LONG-TERM CURRENT USE OF INSULIN (MULTI): ICD-10-CM

## 2025-03-04 DIAGNOSIS — I10 BENIGN ESSENTIAL HTN: ICD-10-CM

## 2025-03-04 DIAGNOSIS — E11.42 DIABETIC POLYNEUROPATHY ASSOCIATED WITH TYPE 2 DIABETES MELLITUS (MULTI): ICD-10-CM

## 2025-03-04 PROBLEM — M75.40 IMPINGEMENT SYNDROME OF SHOULDER REGION: Status: ACTIVE | Noted: 2025-03-04

## 2025-03-04 LAB — POC HEMOGLOBIN A1C: 13 % (ref 4.2–6.5)

## 2025-03-04 PROCEDURE — 3079F DIAST BP 80-89 MM HG: CPT | Performed by: FAMILY MEDICINE

## 2025-03-04 PROCEDURE — 83036 HEMOGLOBIN GLYCOSYLATED A1C: CPT | Performed by: FAMILY MEDICINE

## 2025-03-04 PROCEDURE — 4004F PT TOBACCO SCREEN RCVD TLK: CPT | Performed by: FAMILY MEDICINE

## 2025-03-04 PROCEDURE — 4010F ACE/ARB THERAPY RXD/TAKEN: CPT | Performed by: FAMILY MEDICINE

## 2025-03-04 PROCEDURE — 3075F SYST BP GE 130 - 139MM HG: CPT | Performed by: FAMILY MEDICINE

## 2025-03-04 PROCEDURE — 3008F BODY MASS INDEX DOCD: CPT | Performed by: FAMILY MEDICINE

## 2025-03-04 PROCEDURE — 99214 OFFICE O/P EST MOD 30 MIN: CPT | Performed by: FAMILY MEDICINE

## 2025-03-04 RX ORDER — INSULIN DEGLUDEC 100 U/ML
70 INJECTION, SOLUTION SUBCUTANEOUS NIGHTLY
Qty: 90 ML | Refills: 1 | Status: SHIPPED | OUTPATIENT
Start: 2025-03-04

## 2025-03-04 RX ORDER — ATORVASTATIN CALCIUM 40 MG/1
40 TABLET, FILM COATED ORAL DAILY
Qty: 90 TABLET | Refills: 0 | Status: SHIPPED | OUTPATIENT
Start: 2025-03-04

## 2025-03-04 RX ORDER — METFORMIN HYDROCHLORIDE 500 MG/1
1000 TABLET ORAL 2 TIMES DAILY
Qty: 360 TABLET | Refills: 1 | Status: SHIPPED | OUTPATIENT
Start: 2025-03-04

## 2025-03-04 RX ORDER — GLIPIZIDE 10 MG/1
10 TABLET ORAL
Qty: 180 TABLET | Refills: 1 | Status: SHIPPED | OUTPATIENT
Start: 2025-03-04 | End: 2026-03-04

## 2025-03-04 RX ORDER — DICLOFENAC SODIUM 50 MG/1
1 TABLET, DELAYED RELEASE ORAL
COMMUNITY
Start: 2025-02-26

## 2025-03-04 RX ORDER — LISINOPRIL 20 MG/1
20 TABLET ORAL DAILY
Qty: 90 TABLET | Refills: 1 | Status: SHIPPED | OUTPATIENT
Start: 2025-03-04

## 2025-03-04 NOTE — ASSESSMENT & PLAN NOTE
We need to control his A1c better to try to prevent progression.  He currently says his symptoms are minor.

## 2025-03-04 NOTE — PROGRESS NOTES
"Maxime Marroquin is a 59 y.o. male here today for   Chief Complaint   Patient presents with    Diabetes    Hypertension        Diabetes  He presents for his follow-up diabetic visit. He has type 2 diabetes mellitus. His breakfast blood glucose range is generally >200 mg/dl.      Diabetes recheck -- Patient feeling well.  No CP, numbness of feet, blurred vision, polyuria.  Trying to follow diet.  No side effects from medications.  No hypoglycemic symptoms.  His A1c is very high today at 13.  Tresiba is very expensive but he is taking it correctly.  He says his home glucose readings are often in the 300s.  He does noticed polydipsia and polyphagia frequently.  He is taking metformin correctly.  I had previously tried to add Jardiance but it was too expensive and the patient could not afford it.    HTN recheck -- Patient denies chest pain, SOB, edema, palpitations on review.  Taking medication correctly and denies any side effects.    HLD recheck -- Patient taking medications correctly.  No SE's of muscle pain or joint pain.  No CP, edema, myalgias.    He still smokes cigarettes and says he is not ready to quit yet.      Current Outpatient Medications:     diclofenac (Voltaren) 50 mg EC tablet, Take 1 tablet (50 mg) by mouth 3 times a day., Disp: , Rfl:     atorvastatin (Lipitor) 40 mg tablet, TAKE 1 TABLET BY MOUTH ONCE DAILY, Disp: 90 tablet, Rfl: 0    Easy Touch 31 gauge x 3/16\" needle, USE DAILY AS DIRECTED, Disp: 100 each, Rfl: 3    FreeStyle Lancets 28 gauge, Use to test blood sugar three times daily., Disp: 300 each, Rfl: 3    lisinopril 20 mg tablet, TAKE 1 TABLET BY MOUTH ONCE DAILY, Disp: 30 tablet, Rfl: 1    metFORMIN (Glucophage) 500 mg tablet, TAKE TWO (2) TABLETS BY MOUTH TWICE DAILY, Disp: 360 tablet, Rfl: 0    Tresiba FlexTouch U-100 100 unit/mL (3 mL) injection, INJECT 50 UNITS SUBCUTANEOUSLY AT NIGHT, Disp: 30 mL, Rfl: 3    Patient Active Problem List   Diagnosis    Benign essential HTN    Diabetic " "neuropathy (Multi)    DJD of AC (acromioclavicular) joint    Epidermal inclusion cyst    Gout    Impingement syndrome of left shoulder    Pure hypercholesterolemia    Class 1 obesity due to excess calories with serious comorbidity and body mass index (BMI) of 33.0 to 33.9 in adult    Hypogonadism in male    Presbyopia    Type 2 diabetes mellitus without complication, with long-term current use of insulin (Multi)    Tobacco use    Impingement syndrome of shoulder region         Objective    Visit Vitals    Visit Vitals  /88   Pulse 78   Temp 36.1 °C (97 °F)   Resp 18   Ht 1.88 m (6' 2\")   Wt 107 kg (236 lb)   BMI 30.30 kg/m²   Smoking Status Every Day   BSA 2.36 m²       Body mass index is 30.3 kg/m².     Physical Exam     General - Not in acute distress and cooperative.  Build & Nutrition - Well developed  Posture - Normal  Gait - Normal  Mental Status - alert and oriented x 3    Head - Normocephalic    Neck - Thyroid normal size    Eyes - Bilateral - Sclera clear and lids pink without edema or mass.      Skin - Warm and dry with no rashes on visible skin    Lungs - Clear to auscultation and normal breathing effort    Cardiovascular - RRR and no murmurs, rubs or thrill.    Peripheral Vascular - Bilateral - no edema present    Neuropsychiatric - normal mood and affect        Assessment & Plan  Type 2 diabetes mellitus without complication, with long-term current use of insulin (Multi)  His A1c is still very poorly controlled at 13 today.  We discussed options.  I am going to get a consultation from the clinical pharmacy team to see if they can help lower his cost of medications.  Ideally I would like to add either Jardiance or possibly Trulicity but currently they will be prohibitively expensive for the patient.  So we are going to increase the Tresiba up to 70 units daily.  I am also going to add glipizide 10 mg twice daily.  I told him to take the glipizide with meals.  He says he does not usually eat " breakfast so he will take it with lunch and dinner.  I warned him of the possible side effect of hypoglycemia with the sulfonylureas.  We will follow-up in 3 months for recheck.  Hopefully clinical pharmacy will be able to help and lower the cost of the Tresiba and I will be able to add other medications to better control his severe hyperglycemia.  We will follow-up in 3 months.    Orders:    Referral to Clinical Pharmacy; Future    POCT glycosylated hemoglobin (Hb A1C) manually resulted    insulin degludec (Tresiba) 100 unit/mL injection; Inject 70 Units under the skin once daily at bedtime. Take as directed per insulin instructions.    glipiZIDE (Glucotrol) 10 mg tablet; Take 1 tablet (10 mg) by mouth 2 times a day before meals.    metFORMIN (Glucophage) 500 mg tablet; Take 2 tablets (1,000 mg) by mouth 2 times a day.    Pure hypercholesterolemia  Condition well controlled.  No change in current treatment regimen.  Refill given of current medication.  Appropriate labs ordered or reviewed.  Make a follow up appointment with me for recheck in 6 months.  Orders:    atorvastatin (Lipitor) 40 mg tablet; Take 1 tablet (40 mg) by mouth once daily.    Benign essential HTN  Condition well controlled.  No change in current treatment regimen.  Refill given of current medication.  Appropriate labs ordered or reviewed.  Make a follow up appointment with me for recheck in 6 months.  Orders:    lisinopril 20 mg tablet; Take 1 tablet (20 mg) by mouth once daily.    Diabetic polyneuropathy associated with type 2 diabetes mellitus (Multi)  We need to control his A1c better to try to prevent progression.  He currently says his symptoms are minor.                  Orders Placed This Encounter   Procedures    POCT glycosylated hemoglobin (Hb A1C) manually resulted        New Medications Ordered This Visit   Medications    diclofenac (Voltaren) 50 mg EC tablet     Sig: Take 1 tablet (50 mg) by mouth 3 times a day.

## 2025-03-04 NOTE — ASSESSMENT & PLAN NOTE
His A1c is still very poorly controlled at 13 today.  We discussed options.  I am going to get a consultation from the clinical pharmacy team to see if they can help lower his cost of medications.  Ideally I would like to add either Jardiance or possibly Trulicity but currently they will be prohibitively expensive for the patient.  So we are going to increase the Tresiba up to 70 units daily.  I am also going to add glipizide 10 mg twice daily.  I told him to take the glipizide with meals.  He says he does not usually eat breakfast so he will take it with lunch and dinner.  I warned him of the possible side effect of hypoglycemia with the sulfonylureas.  We will follow-up in 3 months for recheck.  Hopefully clinical pharmacy will be able to help and lower the cost of the Tresiba and I will be able to add other medications to better control his severe hyperglycemia.  We will follow-up in 3 months.    Orders:    Referral to Clinical Pharmacy; Future    POCT glycosylated hemoglobin (Hb A1C) manually resulted    insulin degludec (Tresiba) 100 unit/mL injection; Inject 70 Units under the skin once daily at bedtime. Take as directed per insulin instructions.    glipiZIDE (Glucotrol) 10 mg tablet; Take 1 tablet (10 mg) by mouth 2 times a day before meals.    metFORMIN (Glucophage) 500 mg tablet; Take 2 tablets (1,000 mg) by mouth 2 times a day.

## 2025-03-07 ENCOUNTER — TELEMEDICINE (OUTPATIENT)
Dept: PHARMACY | Facility: HOSPITAL | Age: 60
End: 2025-03-07
Payer: COMMERCIAL

## 2025-03-07 DIAGNOSIS — E11.9 TYPE 2 DIABETES MELLITUS WITHOUT COMPLICATION, WITH LONG-TERM CURRENT USE OF INSULIN (MULTI): ICD-10-CM

## 2025-03-07 DIAGNOSIS — Z79.4 TYPE 2 DIABETES MELLITUS WITHOUT COMPLICATION, WITH LONG-TERM CURRENT USE OF INSULIN (MULTI): ICD-10-CM

## 2025-03-07 NOTE — PROGRESS NOTES
Patient ID: Maxime Marroquin is a 60 y.o. male who presents for Diabetes.  Pt is here for First appointment.     PCP/Referring Provider: Ricardo Ramsey MD  Last Visit: 3.4.25    Verbal consent to manage patient's drug therapy was obtained from patient. They were informed they may decline to participate or withdraw from participation in pharmacy services at any time.      Subjective   Treatment Adherence:   Patient did take medications today.   Number of missed doses in last 7 days: 0.   Can patient afford prescribed medications: No, expresses cost concerns with insulin    Preferred pharmacy:     [facility], [place] - [address]  [address] [address]  Phone: [phone] Fax: [phone]    [facility] [address] [place], [place] - [address] [address]  Phone: [phone] Fax: [phone]      [facility]  DIABETES MELLITUS TYPE 2:    Known diabetic complications: neuropathy.  Does patient follow with Endocrinology?: No     Last optometry exam: > 1 year ago, to be scheduled   Most recent visit in Podiatry: none     Current diabetic medications include:  Tresiba 70 units daily   Glipizide 10mg twice daily - new, not yet started  Metformin IR 1000mg twice daily     Adverse Effects: none reported     Home blood glucose records (mg/dL) - every other day  FB    Any episodes of hypoglycemia? No, denies .    Did patient treat episode of hypoglycemia appropriately? N/A  Does the patient have a prescription for ready-to-use Glucagon? 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 N/A  Statin? No  ACE-I/ARB? Yes  UACR/EGFR in last year?: No  Albumin/Creatine Ratio   Date Value Ref Range Status   10/28/2021 55.6 (H) 0.0 - 30.0 ug/mg crt Final   02/11/2020 40.3 (H) 0.0 - 30.0 ug/mg crt Final       Pertinent PMH Review:  PMH of Pancreatitis: No  PMH of Retinopathy: No  PMH of Urinary Tract Infections: No  PMH of MTC: No  PMH of MEN2: No    Review of Systems    Objective     BP Readings from Last 4 Encounters:   03/04/25 136/88   07/26/24 128/82   04/11/24 130/78   12/28/23 132/82      There were no vitals filed for this visit.     Labs  Creatinine   Date Value Ref Range Status   04/10/2024 0.80 0.50 - 1.30 mg/dL Final     eGFR   Date Value Ref Range Status   04/10/2024 >90 >60 mL/min/1.73m*2 Final     Comment:     Calculations of estimated GFR are performed using the 2021 CKD-EPI Study Refit equation without the race variable for the IDMS-Traceable creatinine methods.  https://jasn.asnjournals.org/content/early/2021/09/22/ASN.0114848037     Sodium   Date Value Ref Range Status   04/10/2024 135 (L) 136 - 145 mmol/L Final     Potassium   Date Value Ref Range Status   04/10/2024 4.5 3.5 - 5.3 mmol/L Final     Chloride   Date Value Ref Range Status   04/10/2024 103 98 - 107 mmol/L Final     Urea Nitrogen   Date Value Ref Range Status   04/10/2024 12 6 - 23 mg/dL Final     AST   Date Value Ref Range Status   04/10/2024 9 9 - 39 U/L Final        Lab Results   Component Value Date    BILITOT 0.6 04/10/2024    CALCIUM 9.2 04/10/2024    CO2 25 04/10/2024     04/10/2024    CREATININE 0.80 04/10/2024    GLUCOSE 286 (H) 04/10/2024    ALKPHOS 80 04/10/2024    K 4.5 04/10/2024    PROT 6.6 04/10/2024     (L) 04/10/2024    AST 9 04/10/2024    ALT 13 04/10/2024    BUN 12 04/10/2024    ANIONGAP 12 04/10/2024    ALBUMIN 4.5 04/10/2024    GFRMALE >90 03/31/2023     Lab Results   Component Value Date    TRIG 104 04/10/2024    CHOL 103 04/10/2024    LDLCALC 52  "04/10/2024    HDL 30.3 04/10/2024     Lab Results   Component Value Date    HGBA1C 13 (A) 2025     Albumin/Creatine Ratio   Date Value Ref Range Status   10/28/2021 55.6 (H) 0.0 - 30.0 ug/mg crt Final   2020 40.3 (H) 0.0 - 30.0 ug/mg crt Final       Current Outpatient Medications   Medication Instructions    atorvastatin (LIPITOR) 40 mg, oral, Daily    diclofenac (Voltaren) 50 mg EC tablet 1 tablet, 3 times daily (0900,1400,1900)    Easy Touch 31 gauge x 3/16\" needle USE DAILY AS DIRECTED    FreeStyle Lancets 28 gauge Use to test blood sugar three times daily.    glipiZIDE (GLUCOTROL) 10 mg, oral, 2 times daily before meals    insulin degludec (TRESIBA) 70 Units, subcutaneous, Nightly, Take as directed per insulin instructions.    lisinopril 20 mg, oral, Daily    metFORMIN (GLUCOPHAGE) 1,000 mg, oral, 2 times daily        DRUG INTERACTIONS:  None requiring intervention at this time    Assessment/Plan   Problem List Items Addressed This Visit             ICD-10-CM    Type 2 diabetes mellitus without complication, with long-term current use of insulin (Multi) E11.9, Z79.4     Is pt A1c at goal? No, 13%  SMBGs limited and above goal. PCP ordered glipizide but has not been started yet. Patient also expresses cost concerns with medicines.   Will review insurance coverage for medicines and CGM. Would ideally like to add SGLT2 and or GLP1 as well.     Encouraged to check sugars at least once daily.     Medication Changes:  CONTINUE:  Tresiba 70 units daily   Glipizide 10mg twice daily  Metformin IR 1000mg twice daily     PATIENT EDUCATION/GOALS  Average < 150mg/dL  Time in range > 70%  Fasting B - 130 mg/dL  Postprandial BG: less than 180 mg/dL  A1c: less than 7%    Low and High Blood Sugar  Symptoms of low blood sugar include: Fast heartbeat, shaking, sweating, nervousness or anxiety, irritability or confusion, and/or dizziness.  Symptoms of high blood sugar include: Feeling more thirsty than usual, " urinating often, losing weight without trying, presence of ketones in the urine, feeling tired and weak, feeling irritable or having other mood changes, having blurry vision, and/or having slow-healing sores.  If you experience symptoms of low blood sugar (blood sugar less than 70 mg/dL) follow the rule of 15 by eating ~15 g of simple carbohydrates (examples: half cup juice, 3-4 glucose tabs, 1 tablespoon of sugar, honey, or syrup).    Dietary Recommendations  The a lower carbohydrate or Mediterranean diet is often recommended for patients with elevated blood glucose.   Food recommendations:   Focus on whole foods, with as few ingredients as possible.   Focus on lower glycemic foods (GI of 55 or less): this includes most fruits and vegetables, beans, minimally processed grains, dairy, nuts and seeds.  Minimize moderate glycemic index (GI 56 to 69) foods: White and sweet potatoes, corn, white rice, couscous, breakfast cereals such as Cream of Wheat.  Avoid high glycemic index (GI of 70 or higher): White bread, rice cakes, most crackers, bagels, cakes, doughnuts, croissants, most packaged breakfast cereals.  Include 1-2 servings weekly fatty fish that are low in mercury such as salmon, mackerel, anchovies, sardines, and herring. Avoid frying fish. Bake, steam, or poach.   Avoid trans-fats (fried foods, microwave popcorn, margarine, etc.).   Use oils such as coconut oil, olive oil, avocado oil, or ghee. Select oils appropriate for the temperature you are cooking at.   Avoid processed meats (such as deli meat), canned soups, soy sauce, and fried foods these are all high in added sodium.   The recommended sodium intake for most people is around 2,300 mg/day (too little or too much salt can affect blood pressure).   It is ok to add salt to suit your taste to fresh whole foods (such as vegetables) that you are cooking at home.   Include 4-5 servings daily of both fruits and vegetables. Fruits and vegetables are a good  source of fiber, potassium, and magnesium which help support healthy blood pressure.  Focus on eating a variety of colors each day (eating the rainbow- such as red peppers, orange carrots, yellow beans, green lettuce, blue/purple berries, white/brown onions).   Avoid foods with added sugars (goal of <10 grams/serving of added sugar). Limit added sugars to less than 24 (women)-36 (men) grams daily.  Beverage recommendations:    Avoid caffeinated drinks such as coffee, energy drinks, and soda (both regular and diet). Consider sparkling water, water with lemon (or other fruit), or black, oolong, or green tea (prior to noon).   Avoid regular consumption of alcohol. If it is a special occasion the recommended alcohol intake for a male is 2 (men) or 1(women) or less drinks per day.  Alcohol consumption may place people with diabetes at increased risk for delayed hypoglycemia (low blood sugar) especially if taking other medications that may cause hypoglycemia such as insulin.     Lifestyle Recommendations  Avoid tobacco products (including chewing tobacco and vaping).   Continue to integrate regular movement and enjoyable forms of exercise into your weekly routine. The recommended exercise regimen is 150 minutes per week (for example 5 days per week, 30 minutes per day).   Consider walking for 10-15 minutes after each meal in order to help control blood sugar.   Manage/reduce stress  Consider therapy, mindfulness, breathing exercises (4-7-8 breath), meditation, yoga, journaling, addressing/removing stressors, etc.   Sleep  Goal of 7-9 hours of restful sleep nightly.             Relevant Orders    Referral to Clinical Pharmacy    Albumin-Creatinine Ratio, Urine Random    Hemoglobin A1c       Immunizations needed: PCV    Labs ordered:  UACR, A1c - tbd in 3 mo     Referrals:  none     Follow-up: 3 weeks     Patient was provided with PharmD phone number and encouraged to reach out with any questions or concerns Prior to next  appointment or ask provider for another pharmacy referral.    Time spent with pt: Total length of time 25 (minutes) of the encounter and more than 50% was spent counseling the patient.  Patient is NOT followed by CCM.     Thank you for allowing to take part in the care of this patient.    Daiana Thomas, PharmD, [facility]  Clinical Pharmacist  [phone]    Continue all meds under the continuation of care with the referring provider and clinical pharmacy team.    Verbal consent to manage patient's drug therapy was obtained from the patient. They were informed they may decline to participate or withdraw from participation in pharmacy services at any time.

## 2025-03-07 NOTE — ASSESSMENT & PLAN NOTE
Is pt A1c at goal? No, 13%  SMBGs limited and above goal. PCP ordered glipizide but has not been started yet. Patient also expresses cost concerns with medicines.   Will review insurance coverage for medicines and CGM. Would ideally like to add SGLT2 and or GLP1 as well.     Encouraged to check sugars at least once daily.     Medication Changes:  CONTINUE:  Tresiba 70 units daily   Glipizide 10mg twice daily  Metformin IR 1000mg twice daily     PATIENT EDUCATION/GOALS  Average < 150mg/dL  Time in range > 70%  Fasting B - 130 mg/dL  Postprandial BG: less than 180 mg/dL  A1c: less than 7%    Low and High Blood Sugar  Symptoms of low blood sugar include: Fast heartbeat, shaking, sweating, nervousness or anxiety, irritability or confusion, and/or dizziness.  Symptoms of high blood sugar include: Feeling more thirsty than usual, urinating often, losing weight without trying, presence of ketones in the urine, feeling tired and weak, feeling irritable or having other mood changes, having blurry vision, and/or having slow-healing sores.  If you experience symptoms of low blood sugar (blood sugar less than 70 mg/dL) follow the rule of 15 by eating ~15 g of simple carbohydrates (examples: half cup juice, 3-4 glucose tabs, 1 tablespoon of sugar, honey, or syrup).    Dietary Recommendations  The a lower carbohydrate or Mediterranean diet is often recommended for patients with elevated blood glucose.   Food recommendations:   Focus on whole foods, with as few ingredients as possible.   Focus on lower glycemic foods (GI of 55 or less): this includes most fruits and vegetables, beans, minimally processed grains, dairy, nuts and seeds.  Minimize moderate glycemic index (GI 56 to 69) foods: White and sweet potatoes, corn, white rice, couscous, breakfast cereals such as Cream of Wheat.  Avoid high glycemic index (GI of 70 or higher): White bread, rice cakes, most crackers, bagels, cakes, doughnuts, croissants, most packaged  breakfast cereals.  Include 1-2 servings weekly fatty fish that are low in mercury such as salmon, mackerel, anchovies, sardines, and herring. Avoid frying fish. Bake, steam, or poach.   Avoid trans-fats (fried foods, microwave popcorn, margarine, etc.).   Use oils such as coconut oil, olive oil, avocado oil, or ghee. Select oils appropriate for the temperature you are cooking at.   Avoid processed meats (such as deli meat), canned soups, soy sauce, and fried foods these are all high in added sodium.   The recommended sodium intake for most people is around 2,300 mg/day (too little or too much salt can affect blood pressure).   It is ok to add salt to suit your taste to fresh whole foods (such as vegetables) that you are cooking at home.   Include 4-5 servings daily of both fruits and vegetables. Fruits and vegetables are a good source of fiber, potassium, and magnesium which help support healthy blood pressure.  Focus on eating a variety of colors each day (eating the rainbow- such as red peppers, orange carrots, yellow beans, green lettuce, blue/purple berries, white/brown onions).   Avoid foods with added sugars (goal of <10 grams/serving of added sugar). Limit added sugars to less than 24 (women)-36 (men) grams daily.  Beverage recommendations:    Avoid caffeinated drinks such as coffee, energy drinks, and soda (both regular and diet). Consider sparkling water, water with lemon (or other fruit), or black, oolong, or green tea (prior to noon).   Avoid regular consumption of alcohol. If it is a special occasion the recommended alcohol intake for a male is 2 (men) or 1(women) or less drinks per day.  Alcohol consumption may place people with diabetes at increased risk for delayed hypoglycemia (low blood sugar) especially if taking other medications that may cause hypoglycemia such as insulin.     Lifestyle Recommendations  Avoid tobacco products (including chewing tobacco and vaping).   Continue to integrate  regular movement and enjoyable forms of exercise into your weekly routine. The recommended exercise regimen is 150 minutes per week (for example 5 days per week, 30 minutes per day).   Consider walking for 10-15 minutes after each meal in order to help control blood sugar.   Manage/reduce stress  Consider therapy, mindfulness, breathing exercises (4-7-8 breath), meditation, yoga, journaling, addressing/removing stressors, etc.   Sleep  Goal of 7-9 hours of restful sleep nightly.

## 2025-04-01 ENCOUNTER — APPOINTMENT (OUTPATIENT)
Dept: PHARMACY | Facility: HOSPITAL | Age: 60
End: 2025-04-01
Payer: COMMERCIAL

## 2025-04-01 DIAGNOSIS — E11.9 TYPE 2 DIABETES MELLITUS WITHOUT COMPLICATION, WITH LONG-TERM CURRENT USE OF INSULIN: ICD-10-CM

## 2025-04-01 DIAGNOSIS — Z79.4 TYPE 2 DIABETES MELLITUS WITHOUT COMPLICATION, WITH LONG-TERM CURRENT USE OF INSULIN: ICD-10-CM

## 2025-04-01 NOTE — ASSESSMENT & PLAN NOTE
Is pt A1c at goal? No, 13%  SMBGs are improving but above goal still. No issues with glipizide.  Patient has commercial insurance which disqualifies him from  assistance programs, but can try copay cards.   Because insurance isn't paying towards medications (5500 deductible), cannot use  patient assistance.     Will provide patient with list of medication options and links to copay cards for him to enroll.    Encouraged to check sugars at least once daily.     Medication Changes:  CONTINUE:  Tresiba 70 units daily   Glipizide 10mg twice daily  Metformin IR 1000mg twice daily     PATIENT EDUCATION/GOALS  Average < 150mg/dL  Time in range > 70%  Fasting B - 130 mg/dL  Postprandial BG: less than 180 mg/dL  A1c: less than 7%    Low and High Blood Sugar  Symptoms of low blood sugar include: Fast heartbeat, shaking, sweating, nervousness or anxiety, irritability or confusion, and/or dizziness.  Symptoms of high blood sugar include: Feeling more thirsty than usual, urinating often, losing weight without trying, presence of ketones in the urine, feeling tired and weak, feeling irritable or having other mood changes, having blurry vision, and/or having slow-healing sores.  If you experience symptoms of low blood sugar (blood sugar less than 70 mg/dL) follow the rule of 15 by eating ~15 g of simple carbohydrates (examples: half cup juice, 3-4 glucose tabs, 1 tablespoon of sugar, honey, or syrup).    Dietary Recommendations  The a lower carbohydrate or Mediterranean diet is often recommended for patients with elevated blood glucose.   Food recommendations:   Focus on whole foods, with as few ingredients as possible.   Focus on lower glycemic foods (GI of 55 or less): this includes most fruits and vegetables, beans, minimally processed grains, dairy, nuts and seeds.  Minimize moderate glycemic index (GI 56 to 69) foods: White and sweet potatoes, corn, white rice, couscous, breakfast cereals such as Cream of  Wheat.  Avoid high glycemic index (GI of 70 or higher): White bread, rice cakes, most crackers, bagels, cakes, doughnuts, croissants, most packaged breakfast cereals.  Include 1-2 servings weekly fatty fish that are low in mercury such as salmon, mackerel, anchovies, sardines, and herring. Avoid frying fish. Bake, steam, or poach.   Avoid trans-fats (fried foods, microwave popcorn, margarine, etc.).   Use oils such as coconut oil, olive oil, avocado oil, or ghee. Select oils appropriate for the temperature you are cooking at.   Avoid processed meats (such as deli meat), canned soups, soy sauce, and fried foods these are all high in added sodium.   The recommended sodium intake for most people is around 2,300 mg/day (too little or too much salt can affect blood pressure).   It is ok to add salt to suit your taste to fresh whole foods (such as vegetables) that you are cooking at home.   Include 4-5 servings daily of both fruits and vegetables. Fruits and vegetables are a good source of fiber, potassium, and magnesium which help support healthy blood pressure.  Focus on eating a variety of colors each day (eating the rainbow- such as red peppers, orange carrots, yellow beans, green lettuce, blue/purple berries, white/brown onions).   Avoid foods with added sugars (goal of <10 grams/serving of added sugar). Limit added sugars to less than 24 (women)-36 (men) grams daily.  Beverage recommendations:    Avoid caffeinated drinks such as coffee, energy drinks, and soda (both regular and diet). Consider sparkling water, water with lemon (or other fruit), or black, oolong, or green tea (prior to noon).   Avoid regular consumption of alcohol. If it is a special occasion the recommended alcohol intake for a male is 2 (men) or 1(women) or less drinks per day.  Alcohol consumption may place people with diabetes at increased risk for delayed hypoglycemia (low blood sugar) especially if taking other medications that may cause  hypoglycemia such as insulin.     Lifestyle Recommendations  Avoid tobacco products (including chewing tobacco and vaping).   Continue to integrate regular movement and enjoyable forms of exercise into your weekly routine. The recommended exercise regimen is 150 minutes per week (for example 5 days per week, 30 minutes per day).   Consider walking for 10-15 minutes after each meal in order to help control blood sugar.   Manage/reduce stress  Consider therapy, mindfulness, breathing exercises (4-7-8 breath), meditation, yoga, journaling, addressing/removing stressors, etc.   Sleep  Goal of 7-9 hours of restful sleep nightly.

## 2025-04-01 NOTE — PROGRESS NOTES
Patient ID: Maxime Marroquin is a 60 y.o. male who presents for Diabetes.  Pt is here for Follow Up appointment.     PCP/Referring Provider: Ricardo Ramsey MD  Last Visit: 3.4.25    Verbal consent to manage patient's drug therapy was obtained from patient. They were informed they may decline to participate or withdraw from participation in pharmacy services at any time.      Subjective   Treatment Adherence:   Patient did take medications today.   Number of missed doses in last 7 days: 0.   Can patient afford prescribed medications: No, expresses cost concerns with insulin    Preferred pharmacy:     [facility], [place] - [address]  [address] [address]  Phone: [phone] Fax: [phone]    [facility] [address] [place], [place] - [address] [address]  Phone: [phone] Fax: [phone]      \[facility] [place] [facility]\""  DIABETES MELLITUS TYPE 2:    Known diabetic complications: neuropathy.  Does patient follow with Endocrinology?: No     Last optometry exam: > 1 year ago, to be scheduled   Most recent visit in Podiatry: none     Current diabetic medications include:  Tresiba 70 units daily   Glipizide 10mg twice daily  Metformin IR 1000mg twice daily     Adverse Effects: none reported     Home blood glucose records (mg/dL) - every other day  FBs    Any episodes of hypoglycemia? No, denies .    Did patient treat episode of hypoglycemia appropriately? N/A  Does the patient have a prescription for ready-to-use Glucagon? 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 N/A  Statin? No  ACE-I/ARB? Yes  UACR/EGFR in last year?: No  Albumin/Creatine Ratio   Date Value Ref Range Status   10/28/2021 55.6 (H) 0.0 - 30.0 ug/mg crt Final   02/11/2020 40.3 (H) 0.0 - 30.0 ug/mg crt Final       Pertinent PMH Review:  PMH of Pancreatitis: No  PMH of Retinopathy: No  PMH of Urinary Tract Infections: No  PMH of MTC: No  PMH of MEN2: No    Review of Systems    Objective     BP Readings from Last 4 Encounters:   03/04/25 136/88   07/26/24 128/82   04/11/24 130/78   12/28/23 132/82      There were no vitals filed for this visit.     Labs  Creatinine   Date Value Ref Range Status   04/10/2024 0.80 0.50 - 1.30 mg/dL Final     eGFR   Date Value Ref Range Status   04/10/2024 >90 >60 mL/min/1.73m*2 Final     Comment:     Calculations of estimated GFR are performed using the 2021 CKD-EPI Study Refit equation without the race variable for the IDMS-Traceable creatinine methods.  https://jasn.asnjournals.org/content/early/2021/09/22/ASN.1567597269     Sodium   Date Value Ref Range Status   04/10/2024 135 (L) 136 - 145 mmol/L Final     Potassium   Date Value Ref Range Status   04/10/2024 4.5 3.5 - 5.3 mmol/L Final     Chloride   Date Value Ref Range Status   04/10/2024 103 98 - 107 mmol/L Final     Urea Nitrogen   Date Value Ref Range Status   04/10/2024 12 6 - 23 mg/dL Final     AST   Date Value Ref Range Status   04/10/2024 9 9 - 39 U/L Final        Lab Results   Component Value Date    BILITOT 0.6 04/10/2024    CALCIUM 9.2 04/10/2024    CO2 25 04/10/2024     04/10/2024    CREATININE 0.80 04/10/2024    GLUCOSE 286 (H) 04/10/2024    ALKPHOS 80 04/10/2024    K 4.5 04/10/2024    PROT 6.6 04/10/2024     (L) 04/10/2024    AST 9 04/10/2024    ALT 13 04/10/2024    BUN 12 04/10/2024    ANIONGAP 12 04/10/2024    ALBUMIN 4.5 04/10/2024    GFRMALE >90 03/31/2023     Lab Results   Component Value Date    TRIG 104 04/10/2024    CHOL 103 04/10/2024    LDLCALC 52 04/10/2024    HDL 30.3  "04/10/2024     Lab Results   Component Value Date    HGBA1C 13 (A) 2025     Albumin/Creatine Ratio   Date Value Ref Range Status   10/28/2021 55.6 (H) 0.0 - 30.0 ug/mg crt Final   2020 40.3 (H) 0.0 - 30.0 ug/mg crt Final       Current Outpatient Medications   Medication Instructions    atorvastatin (LIPITOR) 40 mg, oral, Daily    diclofenac (Voltaren) 50 mg EC tablet 1 tablet, 3 times daily (0900,1400,1900)    Easy Touch 31 gauge x 3/16\" needle USE DAILY AS DIRECTED    FreeStyle Lancets 28 gauge Use to test blood sugar three times daily.    glipiZIDE (GLUCOTROL) 10 mg, oral, 2 times daily before meals    insulin degludec (TRESIBA) 70 Units, subcutaneous, Nightly, Take as directed per insulin instructions.    lisinopril 20 mg, oral, Daily    metFORMIN (GLUCOPHAGE) 1,000 mg, oral, 2 times daily        DRUG INTERACTIONS:  None requiring intervention at this time    Assessment/Plan   Problem List Items Addressed This Visit             ICD-10-CM    Type 2 diabetes mellitus without complication, with long-term current use of insulin E11.9, Z79.4     Is pt A1c at goal? No, 13%  SMBGs are improving but above goal still. No issues with glipizide.  Patient has commercial insurance which disqualifies him from  assistance programs, but can try copay cards.   Because insurance isn't paying towards medications (5500 deductible), cannot use  patient assistance.     Will provide patient with list of medication options and links to copay cards for him to enroll.    Encouraged to check sugars at least once daily.     Medication Changes:  CONTINUE:  Tresiba 70 units daily   Glipizide 10mg twice daily  Metformin IR 1000mg twice daily     PATIENT EDUCATION/GOALS  Average < 150mg/dL  Time in range > 70%  Fasting B - 130 mg/dL  Postprandial BG: less than 180 mg/dL  A1c: less than 7%    Low and High Blood Sugar  Symptoms of low blood sugar include: Fast heartbeat, shaking, sweating, nervousness or anxiety, " irritability or confusion, and/or dizziness.  Symptoms of high blood sugar include: Feeling more thirsty than usual, urinating often, losing weight without trying, presence of ketones in the urine, feeling tired and weak, feeling irritable or having other mood changes, having blurry vision, and/or having slow-healing sores.  If you experience symptoms of low blood sugar (blood sugar less than 70 mg/dL) follow the rule of 15 by eating ~15 g of simple carbohydrates (examples: half cup juice, 3-4 glucose tabs, 1 tablespoon of sugar, honey, or syrup).    Dietary Recommendations  The a lower carbohydrate or Mediterranean diet is often recommended for patients with elevated blood glucose.   Food recommendations:   Focus on whole foods, with as few ingredients as possible.   Focus on lower glycemic foods (GI of 55 or less): this includes most fruits and vegetables, beans, minimally processed grains, dairy, nuts and seeds.  Minimize moderate glycemic index (GI 56 to 69) foods: White and sweet potatoes, corn, white rice, couscous, breakfast cereals such as Cream of Wheat.  Avoid high glycemic index (GI of 70 or higher): White bread, rice cakes, most crackers, bagels, cakes, doughnuts, croissants, most packaged breakfast cereals.  Include 1-2 servings weekly fatty fish that are low in mercury such as salmon, mackerel, anchovies, sardines, and herring. Avoid frying fish. Bake, steam, or poach.   Avoid trans-fats (fried foods, microwave popcorn, margarine, etc.).   Use oils such as coconut oil, olive oil, avocado oil, or ghee. Select oils appropriate for the temperature you are cooking at.   Avoid processed meats (such as deli meat), canned soups, soy sauce, and fried foods these are all high in added sodium.   The recommended sodium intake for most people is around 2,300 mg/day (too little or too much salt can affect blood pressure).   It is ok to add salt to suit your taste to fresh whole foods (such as vegetables) that you  are cooking at home.   Include 4-5 servings daily of both fruits and vegetables. Fruits and vegetables are a good source of fiber, potassium, and magnesium which help support healthy blood pressure.  Focus on eating a variety of colors each day (eating the rainbow- such as red peppers, orange carrots, yellow beans, green lettuce, blue/purple berries, white/brown onions).   Avoid foods with added sugars (goal of <10 grams/serving of added sugar). Limit added sugars to less than 24 (women)-36 (men) grams daily.  Beverage recommendations:    Avoid caffeinated drinks such as coffee, energy drinks, and soda (both regular and diet). Consider sparkling water, water with lemon (or other fruit), or black, oolong, or green tea (prior to noon).   Avoid regular consumption of alcohol. If it is a special occasion the recommended alcohol intake for a male is 2 (men) or 1(women) or less drinks per day.  Alcohol consumption may place people with diabetes at increased risk for delayed hypoglycemia (low blood sugar) especially if taking other medications that may cause hypoglycemia such as insulin.     Lifestyle Recommendations  Avoid tobacco products (including chewing tobacco and vaping).   Continue to integrate regular movement and enjoyable forms of exercise into your weekly routine. The recommended exercise regimen is 150 minutes per week (for example 5 days per week, 30 minutes per day).   Consider walking for 10-15 minutes after each meal in order to help control blood sugar.   Manage/reduce stress  Consider therapy, mindfulness, breathing exercises (4-7-8 breath), meditation, yoga, journaling, addressing/removing stressors, etc.   Sleep  Goal of 7-9 hours of restful sleep nightly.             Relevant Orders    Referral to Clinical Pharmacy       Immunizations needed: PCV  Labs ordered:  none   Referrals:  none     Follow-up: 1 mo     Patient was provided with PharmD phone number and encouraged to reach out with any questions  or concerns Prior to next appointment or ask provider for another pharmacy referral.    Time spent with pt: Total length of time 10 (minutes) of the encounter and more than 50% was spent counseling the patient.  Patient is NOT followed by CCM.     Thank you for allowing to take part in the care of this patient.    Daiana Thomas, PharmD, [facility]  Clinical Pharmacist  [phone]    Continue all meds under the continuation of care with the referring provider and clinical pharmacy team.    Verbal consent to manage patient's drug therapy was obtained from the patient. They were informed they may decline to participate or withdraw from participation in pharmacy services at any time.

## 2025-05-13 ENCOUNTER — APPOINTMENT (OUTPATIENT)
Dept: PHARMACY | Facility: HOSPITAL | Age: 60
End: 2025-05-13
Payer: COMMERCIAL

## 2025-05-13 DIAGNOSIS — Z79.4 TYPE 2 DIABETES MELLITUS WITHOUT COMPLICATION, WITH LONG-TERM CURRENT USE OF INSULIN: ICD-10-CM

## 2025-05-13 DIAGNOSIS — E11.9 TYPE 2 DIABETES MELLITUS WITHOUT COMPLICATION, WITH LONG-TERM CURRENT USE OF INSULIN: ICD-10-CM

## 2025-05-13 NOTE — ASSESSMENT & PLAN NOTE
Is pt A1c at goal? No, 13%  Sugars increased from last and remain above goal. Instructed to continue checking every morning and add PPBG check in the evening. Will likely need meal time insulin. Will increase basal dose today.    Patient has commercial insurance which disqualifies him from  assistance programs, but can try copay cards.   Because insurance isn't paying towards medications (5500 deductible), cannot use  patient assistance.   He has not looked into any copay cards.       Medication Changes:  INCREASE:  Tresiba 85 units daily     CONTINUE  Glipizide 10mg twice daily  Metformin IR 1000mg twice daily     PATIENT EDUCATION/GOALS  Average < 150mg/dL  Time in range > 70%  Fasting B - 130 mg/dL  Postprandial BG: less than 180 mg/dL  A1c: less than 7%    Low and High Blood Sugar  Symptoms of low blood sugar include: Fast heartbeat, shaking, sweating, nervousness or anxiety, irritability or confusion, and/or dizziness.  Symptoms of high blood sugar include: Feeling more thirsty than usual, urinating often, losing weight without trying, presence of ketones in the urine, feeling tired and weak, feeling irritable or having other mood changes, having blurry vision, and/or having slow-healing sores.  If you experience symptoms of low blood sugar (blood sugar less than 70 mg/dL) follow the rule of 15 by eating ~15 g of simple carbohydrates (examples: half cup juice, 3-4 glucose tabs, 1 tablespoon of sugar, honey, or syrup).    Dietary Recommendations  The a lower carbohydrate or Mediterranean diet is often recommended for patients with elevated blood glucose.   Food recommendations:   Focus on whole foods, with as few ingredients as possible.   Focus on lower glycemic foods (GI of 55 or less): this includes most fruits and vegetables, beans, minimally processed grains, dairy, nuts and seeds.  Minimize moderate glycemic index (GI 56 to 69) foods: White and sweet potatoes, corn, white rice, couscous,  breakfast cereals such as Cream of Wheat.  Avoid high glycemic index (GI of 70 or higher): White bread, rice cakes, most crackers, bagels, cakes, doughnuts, croissants, most packaged breakfast cereals.  Include 1-2 servings weekly fatty fish that are low in mercury such as salmon, mackerel, anchovies, sardines, and herring. Avoid frying fish. Bake, steam, or poach.   Avoid trans-fats (fried foods, microwave popcorn, margarine, etc.).   Use oils such as coconut oil, olive oil, avocado oil, or ghee. Select oils appropriate for the temperature you are cooking at.   Avoid processed meats (such as deli meat), canned soups, soy sauce, and fried foods these are all high in added sodium.   The recommended sodium intake for most people is around 2,300 mg/day (too little or too much salt can affect blood pressure).   It is ok to add salt to suit your taste to fresh whole foods (such as vegetables) that you are cooking at home.   Include 4-5 servings daily of both fruits and vegetables. Fruits and vegetables are a good source of fiber, potassium, and magnesium which help support healthy blood pressure.  Focus on eating a variety of colors each day (eating the rainbow- such as red peppers, orange carrots, yellow beans, green lettuce, blue/purple berries, white/brown onions).   Avoid foods with added sugars (goal of <10 grams/serving of added sugar). Limit added sugars to less than 24 (women)-36 (men) grams daily.  Beverage recommendations:    Avoid caffeinated drinks such as coffee, energy drinks, and soda (both regular and diet). Consider sparkling water, water with lemon (or other fruit), or black, oolong, or green tea (prior to noon).   Avoid regular consumption of alcohol. If it is a special occasion the recommended alcohol intake for a male is 2 (men) or 1(women) or less drinks per day.  Alcohol consumption may place people with diabetes at increased risk for delayed hypoglycemia (low blood sugar) especially if taking  other medications that may cause hypoglycemia such as insulin.     Lifestyle Recommendations  Avoid tobacco products (including chewing tobacco and vaping).   Continue to integrate regular movement and enjoyable forms of exercise into your weekly routine. The recommended exercise regimen is 150 minutes per week (for example 5 days per week, 30 minutes per day).   Consider walking for 10-15 minutes after each meal in order to help control blood sugar.   Manage/reduce stress  Consider therapy, mindfulness, breathing exercises (4-7-8 breath), meditation, yoga, journaling, addressing/removing stressors, etc.   Sleep  Goal of 7-9 hours of restful sleep nightly.

## 2025-05-13 NOTE — PROGRESS NOTES
Patient ID: Maxime Marroquin is a 60 y.o. male who presents for Diabetes.  Pt is here for Follow Up appointment.     PCP/Referring Provider: Ricardo Ramsey MD  Last Visit: 3.4.25    Verbal consent to manage patient's drug therapy was obtained from patient. They were informed they may decline to participate or withdraw from participation in pharmacy services at any time.      Subjective   Treatment Adherence:   Patient did take medications today.   Number of missed doses in last 7 days: 0.   Can patient afford prescribed medications: No, expresses cost concerns with insulin    Preferred pharmacy:     [email or web address], [place] - [address]  [address] [facility] [facility] [address]  Phone: [phone] Fax: [phone]    [email or web address] [place], [place] - [address] [address]  Phone: [phone] Fax: [phone]      [facility]  DIABETES MELLITUS TYPE 2:    Known diabetic complications: neuropathy.  Does patient follow with Endocrinology?: No     Last optometry exam: > 1 year ago, to be scheduled   Most recent visit in Podiatry: none     Current diabetic medications include:  Tresiba 70 units daily   Glipizide 10mg twice daily  Metformin IR 1000mg twice daily     Adverse Effects: none reported     Home blood glucose records (mg/dL) - every other day  FBs    Any episodes of hypoglycemia? No, denies.    Did patient treat episode of hypoglycemia appropriately? N/A  Does the patient have a prescription for ready-to-use Glucagon? 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 N/A  Statin? No  ACE-I/ARB? Yes  UACR/EGFR in last year?: No  Albumin/Creatine Ratio   Date Value Ref Range Status   10/28/2021 55.6 (H) 0.0 - 30.0 ug/mg crt Final   02/11/2020 40.3 (H) 0.0 - 30.0 ug/mg crt Final       Pertinent PMH Review:  PMH of Pancreatitis: No  PMH of Retinopathy: No  PMH of Urinary Tract Infections: No  PMH of MTC: No  PMH of MEN2: No    Review of Systems    Objective     BP Readings from Last 4 Encounters:   03/04/25 136/88   07/26/24 128/82   04/11/24 130/78   12/28/23 132/82      There were no vitals filed for this visit.     Labs  Creatinine   Date Value Ref Range Status   04/10/2024 0.80 0.50 - 1.30 mg/dL Final     eGFR   Date Value Ref Range Status   04/10/2024 >90 >60 mL/min/1.73m*2 Final     Comment:     Calculations of estimated GFR are performed using the 2021 CKD-EPI Study Refit equation without the race variable for the IDMS-Traceable creatinine methods.  https://jasn.asnjournals.org/content/early/2021/09/22/ASN.4559096136     Sodium   Date Value Ref Range Status   04/10/2024 135 (L) 136 - 145 mmol/L Final     Potassium   Date Value Ref Range Status   04/10/2024 4.5 3.5 - 5.3 mmol/L Final     Chloride   Date Value Ref Range Status   04/10/2024 103 98 - 107 mmol/L Final     Urea Nitrogen   Date Value Ref Range Status   04/10/2024 12 6 - 23 mg/dL Final     AST   Date Value Ref Range Status   04/10/2024 9 9 - 39 U/L Final        Lab Results   Component Value Date    BILITOT 0.6 04/10/2024    CALCIUM 9.2 04/10/2024    CO2 25 04/10/2024     04/10/2024    CREATININE 0.80 04/10/2024    GLUCOSE 286 (H) 04/10/2024    ALKPHOS 80 04/10/2024    K 4.5 04/10/2024    PROT 6.6 04/10/2024     (L) 04/10/2024    AST 9 04/10/2024    ALT 13 04/10/2024    BUN 12 04/10/2024    ANIONGAP 12 04/10/2024    ALBUMIN 4.5 04/10/2024    GFRMALE >90 03/31/2023     Lab Results   Component Value Date    TRIG 104 04/10/2024    CHOL 103 04/10/2024    LDLCALC 52 04/10/2024    HDL 30.3  "04/10/2024     Lab Results   Component Value Date    HGBA1C 13 (A) 2025     Albumin/Creatine Ratio   Date Value Ref Range Status   10/28/2021 55.6 (H) 0.0 - 30.0 ug/mg crt Final   2020 40.3 (H) 0.0 - 30.0 ug/mg crt Final       Current Outpatient Medications   Medication Instructions    atorvastatin (LIPITOR) 40 mg, oral, Daily    diclofenac (Voltaren) 50 mg EC tablet 1 tablet, 3 times daily (0900,1400,1900)    Easy Touch 31 gauge x 3/16\" needle USE DAILY AS DIRECTED    FreeStyle Lancets 28 gauge Use to test blood sugar three times daily.    glipiZIDE (GLUCOTROL) 10 mg, oral, 2 times daily before meals    insulin degludec (TRESIBA) 70 Units, subcutaneous, Nightly, Take as directed per insulin instructions.    lisinopril 20 mg, oral, Daily    metFORMIN (GLUCOPHAGE) 1,000 mg, oral, 2 times daily        DRUG INTERACTIONS:  None requiring intervention at this time    Assessment/Plan   Problem List Items Addressed This Visit           ICD-10-CM    Type 2 diabetes mellitus without complication, with long-term current use of insulin E11.9, Z79.4    Is pt A1c at goal? No, 13%  Sugars increased from last and remain above goal. Instructed to continue checking every morning and add PPBG check in the evening. Will likely need meal time insulin. Will increase basal dose today.    Patient has commercial insurance which disqualifies him from  assistance programs, but can try copay cards.   Because insurance isn't paying towards medications (5500 deductible), cannot use  patient assistance.   He has not looked into any copay cards.       Medication Changes:  INCREASE:  Tresiba 85 units daily     CONTINUE  Glipizide 10mg twice daily  Metformin IR 1000mg twice daily     PATIENT EDUCATION/GOALS  Average < 150mg/dL  Time in range > 70%  Fasting B - 130 mg/dL  Postprandial BG: less than 180 mg/dL  A1c: less than 7%    Low and High Blood Sugar  Symptoms of low blood sugar include: Fast heartbeat, shaking, " sweating, nervousness or anxiety, irritability or confusion, and/or dizziness.  Symptoms of high blood sugar include: Feeling more thirsty than usual, urinating often, losing weight without trying, presence of ketones in the urine, feeling tired and weak, feeling irritable or having other mood changes, having blurry vision, and/or having slow-healing sores.  If you experience symptoms of low blood sugar (blood sugar less than 70 mg/dL) follow the rule of 15 by eating ~15 g of simple carbohydrates (examples: half cup juice, 3-4 glucose tabs, 1 tablespoon of sugar, honey, or syrup).    Dietary Recommendations  The a lower carbohydrate or Mediterranean diet is often recommended for patients with elevated blood glucose.   Food recommendations:   Focus on whole foods, with as few ingredients as possible.   Focus on lower glycemic foods (GI of 55 or less): this includes most fruits and vegetables, beans, minimally processed grains, dairy, nuts and seeds.  Minimize moderate glycemic index (GI 56 to 69) foods: White and sweet potatoes, corn, white rice, couscous, breakfast cereals such as Cream of Wheat.  Avoid high glycemic index (GI of 70 or higher): White bread, rice cakes, most crackers, bagels, cakes, doughnuts, croissants, most packaged breakfast cereals.  Include 1-2 servings weekly fatty fish that are low in mercury such as salmon, mackerel, anchovies, sardines, and herring. Avoid frying fish. Bake, steam, or poach.   Avoid trans-fats (fried foods, microwave popcorn, margarine, etc.).   Use oils such as coconut oil, olive oil, avocado oil, or ghee. Select oils appropriate for the temperature you are cooking at.   Avoid processed meats (such as deli meat), canned soups, soy sauce, and fried foods these are all high in added sodium.   The recommended sodium intake for most people is around 2,300 mg/day (too little or too much salt can affect blood pressure).   It is ok to add salt to suit your taste to fresh whole  foods (such as vegetables) that you are cooking at home.   Include 4-5 servings daily of both fruits and vegetables. Fruits and vegetables are a good source of fiber, potassium, and magnesium which help support healthy blood pressure.  Focus on eating a variety of colors each day (eating the rainbow- such as red peppers, orange carrots, yellow beans, green lettuce, blue/purple berries, white/brown onions).   Avoid foods with added sugars (goal of <10 grams/serving of added sugar). Limit added sugars to less than 24 (women)-36 (men) grams daily.  Beverage recommendations:    Avoid caffeinated drinks such as coffee, energy drinks, and soda (both regular and diet). Consider sparkling water, water with lemon (or other fruit), or black, oolong, or green tea (prior to noon).   Avoid regular consumption of alcohol. If it is a special occasion the recommended alcohol intake for a male is 2 (men) or 1(women) or less drinks per day.  Alcohol consumption may place people with diabetes at increased risk for delayed hypoglycemia (low blood sugar) especially if taking other medications that may cause hypoglycemia such as insulin.     Lifestyle Recommendations  Avoid tobacco products (including chewing tobacco and vaping).   Continue to integrate regular movement and enjoyable forms of exercise into your weekly routine. The recommended exercise regimen is 150 minutes per week (for example 5 days per week, 30 minutes per day).   Consider walking for 10-15 minutes after each meal in order to help control blood sugar.   Manage/reduce stress  Consider therapy, mindfulness, breathing exercises (4-7-8 breath), meditation, yoga, journaling, addressing/removing stressors, etc.   Sleep  Goal of 7-9 hours of restful sleep nightly.             Relevant Orders    Referral to Clinical Pharmacy     Health Maintenance Due   Topic Date Due    Yearly Adult Physical  Never done    HIV Screening  Never done    MMR Vaccines (1 of 1 - Standard series)  Never done    Hepatitis C Screening  Never done    DTaP/Tdap/Td Vaccines (1 - Tdap) 09/02/2000    Zoster Vaccines (1 of 2) Never done    Pneumococcal Vaccine (2 of 2 - PCV) 10/06/2016    Diabetes: Urine Protein Screening  10/28/2022    Diabetes: Retinopathy Screening  04/26/2023    COVID-19 Vaccine (2 - 2024-25 season) 09/01/2024    Lipid Panel  04/10/2025    Diabetes: Hemoglobin A1C  06/04/2025       Labs ordered:  none   Referrals:  none     Follow-up: 3-4 weeks     Patient was provided with PharmD phone number and encouraged to reach out with any questions or concerns Prior to next appointment or ask provider for another pharmacy referral.    Time spent with pt: Total length of time 15 (minutes) of the encounter and more than 50% was spent counseling the patient.  Patient is NOT followed by CCM.     Thank you for allowing to take part in the care of this patient.    Daiana Thomas, PharmD, [facility]  Clinical Pharmacist  [phone]    Continue all meds under the continuation of care with the referring provider and clinical pharmacy team.    Verbal consent to manage patient's drug therapy was obtained from the patient. They were informed they may decline to participate or withdraw from participation in pharmacy services at any time.

## 2025-06-06 ENCOUNTER — APPOINTMENT (OUTPATIENT)
Dept: PRIMARY CARE | Facility: CLINIC | Age: 60
End: 2025-06-06
Payer: COMMERCIAL

## 2025-06-09 ENCOUNTER — APPOINTMENT (OUTPATIENT)
Dept: PHARMACY | Facility: HOSPITAL | Age: 60
End: 2025-06-09
Payer: COMMERCIAL

## 2025-06-09 DIAGNOSIS — Z79.4 TYPE 2 DIABETES MELLITUS WITHOUT COMPLICATION, WITH LONG-TERM CURRENT USE OF INSULIN: ICD-10-CM

## 2025-06-09 DIAGNOSIS — E11.9 TYPE 2 DIABETES MELLITUS WITHOUT COMPLICATION, WITH LONG-TERM CURRENT USE OF INSULIN: ICD-10-CM

## 2025-06-09 NOTE — ASSESSMENT & PLAN NOTE
Is pt A1c at goal? No, 13%  Sugars improved but remain above goal. Will plan to start meal time insulin once insurance coverage is addressed. Patient still has not looked into copay cards and thinks he will have to go to OTC insulin to save money.   PharmD will look into copay cards and see what is left of deductible. If this is still too expensive, will provide doses for OTC products. Will also send for Ozempic to check coverage as this may help bring deductible down faster.     Patient has commercial insurance which disqualifies him from  assistance programs, but can try copay cards.   Because insurance isn't paying towards medications (5500 deductible), cannot use  patient assistance.       Medication Changes:  CONTINUE:  Tresiba 85 units daily   Glipizide 10mg twice daily  Metformin IR 1000mg twice daily     PATIENT EDUCATION/GOALS  Average < 150mg/dL  Time in range > 70%  Fasting B - 130 mg/dL  Postprandial BG: less than 180 mg/dL  A1c: less than 7%    Low and High Blood Sugar  Symptoms of low blood sugar include: Fast heartbeat, shaking, sweating, nervousness or anxiety, irritability or confusion, and/or dizziness.  Symptoms of high blood sugar include: Feeling more thirsty than usual, urinating often, losing weight without trying, presence of ketones in the urine, feeling tired and weak, feeling irritable or having other mood changes, having blurry vision, and/or having slow-healing sores.  If you experience symptoms of low blood sugar (blood sugar less than 70 mg/dL) follow the rule of 15 by eating ~15 g of simple carbohydrates (examples: half cup juice, 3-4 glucose tabs, 1 tablespoon of sugar, honey, or syrup).    Dietary Recommendations  The a lower carbohydrate or Mediterranean diet is often recommended for patients with elevated blood glucose.   Food recommendations:   Focus on whole foods, with as few ingredients as possible.   Focus on lower glycemic foods (GI of 55 or less): this  includes most fruits and vegetables, beans, minimally processed grains, dairy, nuts and seeds.  Minimize moderate glycemic index (GI 56 to 69) foods: White and sweet potatoes, corn, white rice, couscous, breakfast cereals such as Cream of Wheat.  Avoid high glycemic index (GI of 70 or higher): White bread, rice cakes, most crackers, bagels, cakes, doughnuts, croissants, most packaged breakfast cereals.  Include 1-2 servings weekly fatty fish that are low in mercury such as salmon, mackerel, anchovies, sardines, and herring. Avoid frying fish. Bake, steam, or poach.   Avoid trans-fats (fried foods, microwave popcorn, margarine, etc.).   Use oils such as coconut oil, olive oil, avocado oil, or ghee. Select oils appropriate for the temperature you are cooking at.   Avoid processed meats (such as deli meat), canned soups, soy sauce, and fried foods these are all high in added sodium.   The recommended sodium intake for most people is around 2,300 mg/day (too little or too much salt can affect blood pressure).   It is ok to add salt to suit your taste to fresh whole foods (such as vegetables) that you are cooking at home.   Include 4-5 servings daily of both fruits and vegetables. Fruits and vegetables are a good source of fiber, potassium, and magnesium which help support healthy blood pressure.  Focus on eating a variety of colors each day (eating the rainbow- such as red peppers, orange carrots, yellow beans, green lettuce, blue/purple berries, white/brown onions).   Avoid foods with added sugars (goal of <10 grams/serving of added sugar). Limit added sugars to less than 24 (women)-36 (men) grams daily.  Beverage recommendations:    Avoid caffeinated drinks such as coffee, energy drinks, and soda (both regular and diet). Consider sparkling water, water with lemon (or other fruit), or black, oolong, or green tea (prior to noon).   Avoid regular consumption of alcohol. If it is a special occasion the recommended alcohol  intake for a male is 2 (men) or 1(women) or less drinks per day.  Alcohol consumption may place people with diabetes at increased risk for delayed hypoglycemia (low blood sugar) especially if taking other medications that may cause hypoglycemia such as insulin.     Lifestyle Recommendations  Avoid tobacco products (including chewing tobacco and vaping).   Continue to integrate regular movement and enjoyable forms of exercise into your weekly routine. The recommended exercise regimen is 150 minutes per week (for example 5 days per week, 30 minutes per day).   Consider walking for 10-15 minutes after each meal in order to help control blood sugar.   Manage/reduce stress  Consider therapy, mindfulness, breathing exercises (4-7-8 breath), meditation, yoga, journaling, addressing/removing stressors, etc.   Sleep  Goal of 7-9 hours of restful sleep nightly.

## 2025-06-09 NOTE — PROGRESS NOTES
"      Patient ID: Maxime Marroquin is a 60 y.o. male who presents for Diabetes.  Pt is here for Follow Up appointment.     PCP/Referring Provider: Ricardo Ramsey MD  Last Visit: 3.4.25    Verbal consent to manage patient's drug therapy was obtained from patient. They were informed they may decline to participate or withdraw from participation in pharmacy services at any time.      Subjective   Treatment Adherence:   Patient did take medications today.   Number of missed doses in last 7 days: 0.   Can patient afford prescribed medications: No, expresses cost concerns with insulin    Preferred pharmacy:     [facility] - [place], [place] - [address]  [address] [facility] [address]  Phone: [phone] Fax: [phone]    [facility] - [place], [place] - [address] [address]  Phone: [phone] Fax: [phone]    Interval History:  thinks he has 6 \"tubes\" of tresiba left then says he needs to get it OTC because of bad insurance coverage - will provide doses for OTC products            HPI  DIABETES MELLITUS TYPE 2:    Known diabetic complications: neuropathy.  Does patient follow with Endocrinology?: No     Last optometry exam: > 1 year ago, to be scheduled   Most recent visit in Podiatry: none     Current diabetic medications include:  Tresiba 85 units daily   Glipizide 10mg twice daily  Metformin IR 1000mg twice daily     Adverse Effects: none reported     Home blood glucose records (mg/dL) - every other day  FBs    Any episodes of hypoglycemia? No, denies.    Did patient treat episode of hypoglycemia appropriately? N/A  Does the patient have a prescription for ready-to-use Glucagon? 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 N/A  Statin? No  ACE-I/ARB? Yes  UACR/EGFR in last year?: No  Albumin/Creatine Ratio   Date Value Ref Range Status   10/28/2021 55.6 (H) 0.0 - 30.0 ug/mg crt Final   02/11/2020 40.3 (H) 0.0 - 30.0 ug/mg crt Final       Pertinent PMH Review:  PMH of Pancreatitis: No  PMH of Retinopathy: No  PMH of Urinary Tract Infections: No  PMH of MTC: No  PMH of MEN2: No    Review of Systems    Objective     BP Readings from Last 4 Encounters:   03/04/25 136/88   07/26/24 128/82   04/11/24 130/78   12/28/23 132/82      There were no vitals filed for this visit.     Labs  Creatinine   Date Value Ref Range Status   04/10/2024 0.80 0.50 - 1.30 mg/dL Final     eGFR   Date Value Ref Range Status   04/10/2024 >90 >60 mL/min/1.73m*2 Final     Comment:     Calculations of estimated GFR are performed using the 2021 CKD-EPI Study Refit equation without the race variable for the IDMS-Traceable creatinine methods.  https://jasn.asnjournals.org/content/early/2021/09/22/ASN.2105532344     Sodium   Date Value Ref Range Status   04/10/2024 135 (L) 136 - 145 mmol/L Final     Potassium   Date Value Ref Range Status   04/10/2024 4.5 3.5 - 5.3 mmol/L Final     Chloride   Date Value Ref Range Status   04/10/2024 103 98 - 107 mmol/L Final     Urea Nitrogen   Date Value Ref Range Status   04/10/2024 12 6 - 23 mg/dL Final     AST   Date Value Ref Range Status   04/10/2024 9 9 - 39 U/L Final        Lab Results   Component Value Date    BILITOT 0.6 04/10/2024    CALCIUM 9.2 04/10/2024    CO2 25 04/10/2024     04/10/2024    CREATININE 0.80 04/10/2024    GLUCOSE 286 (H) 04/10/2024    ALKPHOS 80 04/10/2024    K 4.5 04/10/2024    PROT 6.6 04/10/2024     (L) 04/10/2024    AST 9 04/10/2024    ALT 13 04/10/2024    BUN 12 04/10/2024    ANIONGAP 12 04/10/2024     "ALBUMIN 4.5 04/10/2024    GFRMALE >90 03/31/2023     Lab Results   Component Value Date    TRIG 104 04/10/2024    CHOL 103 04/10/2024    LDLCALC 52 04/10/2024    HDL 30.3 04/10/2024     Lab Results   Component Value Date    HGBA1C 13 (A) 03/04/2025     Albumin/Creatine Ratio   Date Value Ref Range Status   10/28/2021 55.6 (H) 0.0 - 30.0 ug/mg crt Final   02/11/2020 40.3 (H) 0.0 - 30.0 ug/mg crt Final       Current Outpatient Medications   Medication Instructions    atorvastatin (LIPITOR) 40 mg, oral, Daily    diclofenac (Voltaren) 50 mg EC tablet 1 tablet, 3 times daily (0900,1400,1900)    Easy Touch 31 gauge x 3/16\" needle USE DAILY AS DIRECTED    FreeStyle Lancets 28 gauge Use to test blood sugar three times daily.    glipiZIDE (GLUCOTROL) 10 mg, oral, 2 times daily before meals    insulin degludec (TRESIBA) 70 Units, subcutaneous, Nightly, Take as directed per insulin instructions.    lisinopril 20 mg, oral, Daily    metFORMIN (GLUCOPHAGE) 1,000 mg, oral, 2 times daily        DRUG INTERACTIONS:  None requiring intervention at this time    Assessment/Plan   Problem List Items Addressed This Visit           ICD-10-CM    Type 2 diabetes mellitus without complication, with long-term current use of insulin E11.9, Z79.4    Is pt A1c at goal? No, 13%  Sugars improved but remain above goal. Will plan to start meal time insulin once insurance coverage is addressed. Patient still has not looked into copay cards and thinks he will have to go to OTC insulin to save money.   PharmD will look into copay cards and see what is left of deductible. If this is still too expensive, will provide doses for OTC products.    Patient has commercial insurance which disqualifies him from  assistance programs, but can try copay cards.   Because insurance isn't paying towards medications (5500 deductible), cannot use  patient assistance.       Medication Changes:  CONTINUE:  Tresiba 85 units daily   Glipizide 10mg twice " daily  Metformin IR 1000mg twice daily     PATIENT EDUCATION/GOALS  Average < 150mg/dL  Time in range > 70%  Fasting B - 130 mg/dL  Postprandial BG: less than 180 mg/dL  A1c: less than 7%    Low and High Blood Sugar  Symptoms of low blood sugar include: Fast heartbeat, shaking, sweating, nervousness or anxiety, irritability or confusion, and/or dizziness.  Symptoms of high blood sugar include: Feeling more thirsty than usual, urinating often, losing weight without trying, presence of ketones in the urine, feeling tired and weak, feeling irritable or having other mood changes, having blurry vision, and/or having slow-healing sores.  If you experience symptoms of low blood sugar (blood sugar less than 70 mg/dL) follow the rule of 15 by eating ~15 g of simple carbohydrates (examples: half cup juice, 3-4 glucose tabs, 1 tablespoon of sugar, honey, or syrup).    Dietary Recommendations  The a lower carbohydrate or Mediterranean diet is often recommended for patients with elevated blood glucose.   Food recommendations:   Focus on whole foods, with as few ingredients as possible.   Focus on lower glycemic foods (GI of 55 or less): this includes most fruits and vegetables, beans, minimally processed grains, dairy, nuts and seeds.  Minimize moderate glycemic index (GI 56 to 69) foods: White and sweet potatoes, corn, white rice, couscous, breakfast cereals such as Cream of Wheat.  Avoid high glycemic index (GI of 70 or higher): White bread, rice cakes, most crackers, bagels, cakes, doughnuts, croissants, most packaged breakfast cereals.  Include 1-2 servings weekly fatty fish that are low in mercury such as salmon, mackerel, anchovies, sardines, and herring. Avoid frying fish. Bake, steam, or poach.   Avoid trans-fats (fried foods, microwave popcorn, margarine, etc.).   Use oils such as coconut oil, olive oil, avocado oil, or ghee. Select oils appropriate for the temperature you are cooking at.   Avoid processed meats  (such as deli meat), canned soups, soy sauce, and fried foods these are all high in added sodium.   The recommended sodium intake for most people is around 2,300 mg/day (too little or too much salt can affect blood pressure).   It is ok to add salt to suit your taste to fresh whole foods (such as vegetables) that you are cooking at home.   Include 4-5 servings daily of both fruits and vegetables. Fruits and vegetables are a good source of fiber, potassium, and magnesium which help support healthy blood pressure.  Focus on eating a variety of colors each day (eating the rainbow- such as red peppers, orange carrots, yellow beans, green lettuce, blue/purple berries, white/brown onions).   Avoid foods with added sugars (goal of <10 grams/serving of added sugar). Limit added sugars to less than 24 (women)-36 (men) grams daily.  Beverage recommendations:    Avoid caffeinated drinks such as coffee, energy drinks, and soda (both regular and diet). Consider sparkling water, water with lemon (or other fruit), or black, oolong, or green tea (prior to noon).   Avoid regular consumption of alcohol. If it is a special occasion the recommended alcohol intake for a male is 2 (men) or 1(women) or less drinks per day.  Alcohol consumption may place people with diabetes at increased risk for delayed hypoglycemia (low blood sugar) especially if taking other medications that may cause hypoglycemia such as insulin.     Lifestyle Recommendations  Avoid tobacco products (including chewing tobacco and vaping).   Continue to integrate regular movement and enjoyable forms of exercise into your weekly routine. The recommended exercise regimen is 150 minutes per week (for example 5 days per week, 30 minutes per day).   Consider walking for 10-15 minutes after each meal in order to help control blood sugar.   Manage/reduce stress  Consider therapy, mindfulness, breathing exercises (4-7-8 breath), meditation, yoga, journaling, addressing/removing  stressors, etc.   Sleep  Goal of 7-9 hours of restful sleep nightly.                Health Maintenance Due   Topic Date Due    Yearly Adult Physical  Never done    HIV Screening  Never done    MMR Vaccines (1 of 1 - Standard series) Never done    Hepatitis C Screening  Never done    DTaP/Tdap/Td Vaccines (1 - Tdap) 09/02/2000    Zoster Vaccines (1 of 2) Never done    Pneumococcal Vaccine (2 of 2 - PCV) 10/06/2016    Diabetes: Urine Protein Screening  10/28/2022    Diabetes: Retinopathy Screening  04/26/2023    COVID-19 Vaccine (2 - 2024-25 season) 09/01/2024    Lipid Panel  04/10/2025    Diabetes: Hemoglobin A1C  06/04/2025       Labs ordered:  none   Referrals:  none     Follow-up: 3-4 weeks     Patient was provided with PharmD phone number and encouraged to reach out with any questions or concerns Prior to next appointment or ask provider for another pharmacy referral.    Time spent with pt: Total length of time 15 (minutes) of the encounter and more than 50% was spent counseling the patient.  Patient is NOT followed by CCM.     Thank you for allowing to take part in the care of this patient.    Daiana Thomas, PharmD, [facility]  Clinical Pharmacist  [phone]    Continue all meds under the continuation of care with the referring provider and clinical pharmacy team.    Verbal consent to manage patient's drug therapy was obtained from the patient. They were informed they may decline to participate or withdraw from participation in pharmacy services at any time.

## 2025-06-30 ENCOUNTER — APPOINTMENT (OUTPATIENT)
Dept: PHARMACY | Facility: HOSPITAL | Age: 60
End: 2025-06-30
Payer: COMMERCIAL

## 2025-06-30 DIAGNOSIS — E11.9 TYPE 2 DIABETES MELLITUS WITHOUT COMPLICATION, WITH LONG-TERM CURRENT USE OF INSULIN: ICD-10-CM

## 2025-06-30 DIAGNOSIS — Z79.4 TYPE 2 DIABETES MELLITUS WITHOUT COMPLICATION, WITH LONG-TERM CURRENT USE OF INSULIN: ICD-10-CM

## 2025-06-30 PROCEDURE — RXMED WILLOW AMBULATORY MEDICATION CHARGE

## 2025-06-30 RX ORDER — INSULIN GLARGINE 100 [IU]/ML
40 INJECTION, SOLUTION SUBCUTANEOUS 2 TIMES DAILY
Qty: 30 ML | Refills: 11 | Status: SHIPPED | OUTPATIENT
Start: 2025-06-30

## 2025-06-30 NOTE — ASSESSMENT & PLAN NOTE
Is pt A1c at goal? No, 13%  Sugars improved but remain above goal. Reviewed medications options with  Pharmacy - Lantus will be the lowest cost to patient. Agreeable to change. He is aware that it will not contribute to deductible.     Not interested in Ozempic today. I believe this would be a good choice for BG control as well as weight loss benefit and cardiorenal protection. If a copay card can be used, this would help bring down deductible.    Patient has commercial insurance which disqualifies him from  assistance programs. Because insurance isn't paying towards medications (5500 deductible), cannot use  patient assistance.       Medication Changes:  STOP:  Tresiba 85 units daily     START:  Lantus 40units twice daily     CONTINUE:  Glipizide 10mg twice daily  Metformin IR 1000mg twice daily     PATIENT EDUCATION/GOALS  Average < 150mg/dL  Time in range > 70%  Fasting B - 130 mg/dL  Postprandial BG: less than 180 mg/dL  A1c: less than 7%    Low and High Blood Sugar  Symptoms of low blood sugar include: Fast heartbeat, shaking, sweating, nervousness or anxiety, irritability or confusion, and/or dizziness.  Symptoms of high blood sugar include: Feeling more thirsty than usual, urinating often, losing weight without trying, presence of ketones in the urine, feeling tired and weak, feeling irritable or having other mood changes, having blurry vision, and/or having slow-healing sores.  If you experience symptoms of low blood sugar (blood sugar less than 70 mg/dL) follow the rule of 15 by eating ~15 g of simple carbohydrates (examples: half cup juice, 3-4 glucose tabs, 1 tablespoon of sugar, honey, or syrup).    Dietary Recommendations  The a lower carbohydrate or Mediterranean diet is often recommended for patients with elevated blood glucose.   Food recommendations:   Focus on whole foods, with as few ingredients as possible.   Focus on lower glycemic foods (GI of 55 or less): this includes  most fruits and vegetables, beans, minimally processed grains, dairy, nuts and seeds.  Minimize moderate glycemic index (GI 56 to 69) foods: White and sweet potatoes, corn, white rice, couscous, breakfast cereals such as Cream of Wheat.  Avoid high glycemic index (GI of 70 or higher): White bread, rice cakes, most crackers, bagels, cakes, doughnuts, croissants, most packaged breakfast cereals.  Include 1-2 servings weekly fatty fish that are low in mercury such as salmon, mackerel, anchovies, sardines, and herring. Avoid frying fish. Bake, steam, or poach.   Avoid trans-fats (fried foods, microwave popcorn, margarine, etc.).   Use oils such as coconut oil, olive oil, avocado oil, or ghee. Select oils appropriate for the temperature you are cooking at.   Avoid processed meats (such as deli meat), canned soups, soy sauce, and fried foods these are all high in added sodium.   The recommended sodium intake for most people is around 2,300 mg/day (too little or too much salt can affect blood pressure).   It is ok to add salt to suit your taste to fresh whole foods (such as vegetables) that you are cooking at home.   Include 4-5 servings daily of both fruits and vegetables. Fruits and vegetables are a good source of fiber, potassium, and magnesium which help support healthy blood pressure.  Focus on eating a variety of colors each day (eating the rainbow- such as red peppers, orange carrots, yellow beans, green lettuce, blue/purple berries, white/brown onions).   Avoid foods with added sugars (goal of <10 grams/serving of added sugar). Limit added sugars to less than 24 (women)-36 (men) grams daily.  Beverage recommendations:    Avoid caffeinated drinks such as coffee, energy drinks, and soda (both regular and diet). Consider sparkling water, water with lemon (or other fruit), or black, oolong, or green tea (prior to noon).   Avoid regular consumption of alcohol. If it is a special occasion the recommended alcohol intake  for a male is 2 (men) or 1(women) or less drinks per day.  Alcohol consumption may place people with diabetes at increased risk for delayed hypoglycemia (low blood sugar) especially if taking other medications that may cause hypoglycemia such as insulin.     Lifestyle Recommendations  Avoid tobacco products (including chewing tobacco and vaping).   Continue to integrate regular movement and enjoyable forms of exercise into your weekly routine. The recommended exercise regimen is 150 minutes per week (for example 5 days per week, 30 minutes per day).   Consider walking for 10-15 minutes after each meal in order to help control blood sugar.   Manage/reduce stress  Consider therapy, mindfulness, breathing exercises (4-7-8 breath), meditation, yoga, journaling, addressing/removing stressors, etc.   Sleep  Goal of 7-9 hours of restful sleep nightly.      Insulin Education:  Counseled patient on Lantus MOA, expectations, side effects, duration of therapy, administration, storage and monitoring parameters.  Medication should be taken at the same time of day each day  Insulin can be injected under the skin to the stomach, thigh or upper arm. Rotate sites with each injection.   New needle/syringe should be utilized with each injection  Addressed all of patients questions and concerns at time of appointment. Encouraged to reach out to PharmD with additional needs.

## 2025-06-30 NOTE — PROGRESS NOTES
"      Patient ID: Maxime Marroquin is a 60 y.o. male who presents for Diabetes.  Pt is here for Follow Up appointment.     PCP/Referring Provider: Ricardo Ramsey MD  Last Visit: 3.4.25    Verbal consent to manage patient's drug therapy was obtained from patient. They were informed they may decline to participate or withdraw from participation in pharmacy services at any time.      Subjective   Treatment Adherence:   Patient did take medications today.   Number of missed doses in last 7 days: 0.   Can patient afford prescribed medications: No, expresses cost concerns with insulin    Preferred pharmacy:     [facility], [place] - [address] [place]  [address] [facility] [address]  Phone: [phone] Fax: [phone]    [facility] - [place], [place] - [address] [address]  Phone: [phone] Fax: [phone]    [facility]  [address], [address]  [facility] [address]  Phone: [phone] Fax: [phone]    Interval History:  Tresiba $99/month with copay card for brand - applies to deductible (currently costs \"off the charts\")  Lantus would be $35/month but does not apply to deductible  Lispro would be $35/month - not sure about deductible  Ozempic required PA, approved - waiting for copay info    HPI  DIABETES MELLITUS TYPE 2:    Known diabetic complications: neuropathy.  Does patient follow with Endocrinology?: No     Last optometry exam: > 1 year ago, to be scheduled   Most recent visit in Podiatry: none     Current diabetic medications include:  Tresiba 85 units daily   Glipizide 10mg twice daily  Metformin IR 1000mg twice daily     Adverse Effects: none reported     Home blood glucose records (mg/dL) - every other day  FB lowest, <200 typically    Any episodes of hypoglycemia? No, denies.    Did patient treat episode of hypoglycemia appropriately? N/A  Does the patient have a prescription for ready-to-use " Glucagon? 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 N/A  Statin? No  ACE-I/ARB? Yes  UACR/EGFR in last year?: No  Albumin/Creatine Ratio   Date Value Ref Range Status   10/28/2021 55.6 (H) 0.0 - 30.0 ug/mg crt Final   02/11/2020 40.3 (H) 0.0 - 30.0 ug/mg crt Final       Pertinent PMH Review:  PMH of Pancreatitis: No  PMH of Retinopathy: No  PMH of Urinary Tract Infections: No  PMH of MTC: No  PMH of MEN2: No    Review of Systems    Objective     BP Readings from Last 4 Encounters:   03/04/25 136/88   07/26/24 128/82   04/11/24 130/78   12/28/23 132/82      There were no vitals filed for this visit.     Labs  Creatinine   Date Value Ref Range Status   04/10/2024 0.80 0.50 - 1.30 mg/dL Final     eGFR   Date Value Ref Range Status   04/10/2024 >90 >60 mL/min/1.73m*2 Final     Comment:     Calculations of estimated GFR are performed using the 2021 CKD-EPI Study Refit equation without the race variable for the IDMS-Traceable creatinine methods.  https://jasn.asnjournals.org/content/early/2021/09/22/ASN.8413293361     Sodium   Date Value Ref Range Status   04/10/2024 135 (L) 136 - 145 mmol/L Final     Potassium   Date Value Ref Range Status   04/10/2024 4.5 3.5 - 5.3 mmol/L Final     Chloride   Date Value Ref Range Status   04/10/2024 103 98 - 107 mmol/L Final     Urea Nitrogen   Date Value Ref Range Status   04/10/2024 12 6 - 23 mg/dL Final     AST   Date Value Ref Range Status   04/10/2024 9 9 - 39 U/L Final        Lab Results   Component Value Date    BILITOT 0.6 04/10/2024    CALCIUM 9.2 04/10/2024    CO2 25  "04/10/2024     04/10/2024    CREATININE 0.80 04/10/2024    GLUCOSE 286 (H) 04/10/2024    ALKPHOS 80 04/10/2024    K 4.5 04/10/2024    PROT 6.6 04/10/2024     (L) 04/10/2024    AST 9 04/10/2024    ALT 13 04/10/2024    BUN 12 04/10/2024    ANIONGAP 12 04/10/2024    ALBUMIN 4.5 04/10/2024    GFRMALE >90 03/31/2023     Lab Results   Component Value Date    TRIG 104 04/10/2024    CHOL 103 04/10/2024    LDLCALC 52 04/10/2024    HDL 30.3 04/10/2024     Lab Results   Component Value Date    HGBA1C 13 (A) 03/04/2025     Albumin/Creatine Ratio   Date Value Ref Range Status   10/28/2021 55.6 (H) 0.0 - 30.0 ug/mg crt Final   02/11/2020 40.3 (H) 0.0 - 30.0 ug/mg crt Final       Current Outpatient Medications   Medication Instructions    atorvastatin (LIPITOR) 40 mg, oral, Daily    diclofenac (Voltaren) 50 mg EC tablet 1 tablet, 3 times daily (0900,1400,1900)    Easy Touch 31 gauge x 3/16\" needle USE DAILY AS DIRECTED    FreeStyle Lancets 28 gauge Use to test blood sugar three times daily.    glipiZIDE (GLUCOTROL) 10 mg, oral, 2 times daily before meals    Lantus Solostar U-100 Insulin 40 Units, subcutaneous, 2 times daily, Take as directed per insulin instructions.    lisinopril 20 mg, oral, Daily    metFORMIN (GLUCOPHAGE) 1,000 mg, oral, 2 times daily    semaglutide 0.25 mg, subcutaneous, Weekly        DRUG INTERACTIONS:  None requiring intervention at this time    Assessment/Plan   Problem List Items Addressed This Visit           ICD-10-CM    Type 2 diabetes mellitus without complication, with long-term current use of insulin E11.9, Z79.4    Is pt A1c at goal? No, 13%  Sugars improved but remain above goal. Reviewed medications options with  Pharmacy - Lantus will be the lowest cost to patient. Agreeable to change. He is aware that it will not contribute to deductible.     Not interested in Ozempic today. I believe this would be a good choice for BG control as well as weight loss benefit and cardiorenal " protection. If a copay card can be used, this would help bring down deductible.    Patient has commercial insurance which disqualifies him from  assistance programs. Because insurance isn't paying towards medications (5500 deductible), cannot use  patient assistance.       Medication Changes:  STOP:  Tresiba 85 units daily     START:  Lantus 40units twice daily     CONTINUE:  Glipizide 10mg twice daily  Metformin IR 1000mg twice daily     PATIENT EDUCATION/GOALS  Average < 150mg/dL  Time in range > 70%  Fasting B - 130 mg/dL  Postprandial BG: less than 180 mg/dL  A1c: less than 7%    Low and High Blood Sugar  Symptoms of low blood sugar include: Fast heartbeat, shaking, sweating, nervousness or anxiety, irritability or confusion, and/or dizziness.  Symptoms of high blood sugar include: Feeling more thirsty than usual, urinating often, losing weight without trying, presence of ketones in the urine, feeling tired and weak, feeling irritable or having other mood changes, having blurry vision, and/or having slow-healing sores.  If you experience symptoms of low blood sugar (blood sugar less than 70 mg/dL) follow the rule of 15 by eating ~15 g of simple carbohydrates (examples: half cup juice, 3-4 glucose tabs, 1 tablespoon of sugar, honey, or syrup).    Dietary Recommendations  The a lower carbohydrate or Mediterranean diet is often recommended for patients with elevated blood glucose.   Food recommendations:   Focus on whole foods, with as few ingredients as possible.   Focus on lower glycemic foods (GI of 55 or less): this includes most fruits and vegetables, beans, minimally processed grains, dairy, nuts and seeds.  Minimize moderate glycemic index (GI 56 to 69) foods: White and sweet potatoes, corn, white rice, couscous, breakfast cereals such as Cream of Wheat.  Avoid high glycemic index (GI of 70 or higher): White bread, rice cakes, most crackers, bagels, cakes, doughnuts, croissants, most  packaged breakfast cereals.  Include 1-2 servings weekly fatty fish that are low in mercury such as salmon, mackerel, anchovies, sardines, and herring. Avoid frying fish. Bake, steam, or poach.   Avoid trans-fats (fried foods, microwave popcorn, margarine, etc.).   Use oils such as coconut oil, olive oil, avocado oil, or ghee. Select oils appropriate for the temperature you are cooking at.   Avoid processed meats (such as deli meat), canned soups, soy sauce, and fried foods these are all high in added sodium.   The recommended sodium intake for most people is around 2,300 mg/day (too little or too much salt can affect blood pressure).   It is ok to add salt to suit your taste to fresh whole foods (such as vegetables) that you are cooking at home.   Include 4-5 servings daily of both fruits and vegetables. Fruits and vegetables are a good source of fiber, potassium, and magnesium which help support healthy blood pressure.  Focus on eating a variety of colors each day (eating the rainbow- such as red peppers, orange carrots, yellow beans, green lettuce, blue/purple berries, white/brown onions).   Avoid foods with added sugars (goal of <10 grams/serving of added sugar). Limit added sugars to less than 24 (women)-36 (men) grams daily.  Beverage recommendations:    Avoid caffeinated drinks such as coffee, energy drinks, and soda (both regular and diet). Consider sparkling water, water with lemon (or other fruit), or black, oolong, or green tea (prior to noon).   Avoid regular consumption of alcohol. If it is a special occasion the recommended alcohol intake for a male is 2 (men) or 1(women) or less drinks per day.  Alcohol consumption may place people with diabetes at increased risk for delayed hypoglycemia (low blood sugar) especially if taking other medications that may cause hypoglycemia such as insulin.     Lifestyle Recommendations  Avoid tobacco products (including chewing tobacco and vaping).   Continue to  integrate regular movement and enjoyable forms of exercise into your weekly routine. The recommended exercise regimen is 150 minutes per week (for example 5 days per week, 30 minutes per day).   Consider walking for 10-15 minutes after each meal in order to help control blood sugar.   Manage/reduce stress  Consider therapy, mindfulness, breathing exercises (4-7-8 breath), meditation, yoga, journaling, addressing/removing stressors, etc.   Sleep  Goal of 7-9 hours of restful sleep nightly.      Insulin Education:  Counseled patient on Lantus MOA, expectations, side effects, duration of therapy, administration, storage and monitoring parameters.  Medication should be taken at the same time of day each day  Insulin can be injected under the skin to the stomach, thigh or upper arm. Rotate sites with each injection.   New needle/syringe should be utilized with each injection  Addressed all of patients questions and concerns at time of appointment. Encouraged to reach out to PharmD with additional needs.           Relevant Medications    insulin glargine (Lantus Solostar U-100 Insulin) 100 unit/mL (3 mL) pen    Other Relevant Orders    Referral to Clinical Pharmacy         Health Maintenance Due   Topic Date Due    Yearly Adult Physical  Never done    HIV Screening  Never done    MMR Vaccines (1 of 1 - Standard series) Never done    Hepatitis C Screening  Never done    DTaP/Tdap/Td Vaccines (1 - Tdap) 09/02/2000    Zoster Vaccines (1 of 2) Never done    Pneumococcal Vaccine (2 of 2 - PCV) 10/06/2016    Diabetes: Urine Protein Screening  10/28/2022    Diabetes: Retinopathy Screening  04/26/2023    COVID-19 Vaccine (2 - 2024-25 season) 09/01/2024    Lipid Panel  04/10/2025    Diabetes: Hemoglobin A1C  06/04/2025       Labs ordered:  none   Referrals:  none     Follow-up: 3-4 weeks     Patient was provided with PharmD phone number and encouraged to reach out with any questions or concerns Prior to next appointment or ask  provider for another pharmacy referral.    Time spent with pt: Total length of time 20 (minutes) of the encounter and more than 50% was spent counseling the patient.  Patient is NOT followed by CCM.     Thank you for allowing to take part in the care of this patient.    Daiana Thomas, PharmD, [facility]  Clinical Pharmacist  [phone]    Continue all meds under the continuation of care with the referring provider and clinical pharmacy team.    Verbal consent to manage patient's drug therapy was obtained from the patient. They were informed they may decline to participate or withdraw from participation in pharmacy services at any time.

## 2025-07-01 ENCOUNTER — PHARMACY VISIT (OUTPATIENT)
Dept: PHARMACY | Facility: CLINIC | Age: 60
End: 2025-07-01
Payer: COMMERCIAL

## 2025-07-01 ENCOUNTER — APPOINTMENT (OUTPATIENT)
Dept: PRIMARY CARE | Facility: CLINIC | Age: 60
End: 2025-07-01
Payer: COMMERCIAL

## 2025-07-01 VITALS
WEIGHT: 242 LBS | SYSTOLIC BLOOD PRESSURE: 136 MMHG | RESPIRATION RATE: 18 BRPM | HEIGHT: 74 IN | TEMPERATURE: 98 F | DIASTOLIC BLOOD PRESSURE: 84 MMHG | HEART RATE: 82 BPM | BODY MASS INDEX: 31.06 KG/M2

## 2025-07-01 DIAGNOSIS — I10 BENIGN ESSENTIAL HTN: Primary | ICD-10-CM

## 2025-07-01 DIAGNOSIS — E11.9 TYPE 2 DIABETES MELLITUS WITHOUT COMPLICATION, WITH LONG-TERM CURRENT USE OF INSULIN: ICD-10-CM

## 2025-07-01 DIAGNOSIS — Z79.4 TYPE 2 DIABETES MELLITUS WITHOUT COMPLICATION, WITH LONG-TERM CURRENT USE OF INSULIN: ICD-10-CM

## 2025-07-01 LAB — POC HEMOGLOBIN A1C: 10.1 % (ref 4.2–6.5)

## 2025-07-01 PROCEDURE — 3079F DIAST BP 80-89 MM HG: CPT | Performed by: FAMILY MEDICINE

## 2025-07-01 PROCEDURE — 4010F ACE/ARB THERAPY RXD/TAKEN: CPT | Performed by: FAMILY MEDICINE

## 2025-07-01 PROCEDURE — 99213 OFFICE O/P EST LOW 20 MIN: CPT | Performed by: FAMILY MEDICINE

## 2025-07-01 PROCEDURE — 3075F SYST BP GE 130 - 139MM HG: CPT | Performed by: FAMILY MEDICINE

## 2025-07-01 PROCEDURE — 3046F HEMOGLOBIN A1C LEVEL >9.0%: CPT | Performed by: FAMILY MEDICINE

## 2025-07-01 PROCEDURE — 83036 HEMOGLOBIN GLYCOSYLATED A1C: CPT | Performed by: FAMILY MEDICINE

## 2025-07-01 PROCEDURE — 4004F PT TOBACCO SCREEN RCVD TLK: CPT | Performed by: FAMILY MEDICINE

## 2025-07-01 PROCEDURE — 3008F BODY MASS INDEX DOCD: CPT | Performed by: FAMILY MEDICINE

## 2025-07-01 ASSESSMENT — PATIENT HEALTH QUESTIONNAIRE - PHQ9
1. LITTLE INTEREST OR PLEASURE IN DOING THINGS: NOT AT ALL
SUM OF ALL RESPONSES TO PHQ9 QUESTIONS 1 AND 2: 0
2. FEELING DOWN, DEPRESSED OR HOPELESS: NOT AT ALL

## 2025-07-01 NOTE — PROGRESS NOTES
"Maxime Marroquin is a 60 y.o. male here today for   Chief Complaint   Patient presents with    Diabetes        HPI     Diabetes recheck -- Patient feeling well.  No CP, numbness of feet, blurred vision, polyuria.  Trying to follow diet.  No side effects from medications.  No hypoglycemic symptoms.   He has been working with the pharmacist from  and they switched from Tresiba to Lantus for a cost savings.  He is still taking glipizide and metformin at the same doses.  She also recommended to start semaglutide once weekly and this will be starting in the near future.  AM glucose was 178 today.  A1C much improved to 10.1 today.  Will change to Lantus and add semaglutide soon.  No change in diet.  Not many sweets.  Not carb counting.         Current Outpatient Medications   Medication Instructions    atorvastatin (LIPITOR) 40 mg, oral, Daily    diclofenac (Voltaren) 50 mg EC tablet 1 tablet, 3 times daily (0900,1400,1900)    Easy Touch 31 gauge x 3/16\" needle USE DAILY AS DIRECTED    FreeStyle Lancets 28 gauge Use to test blood sugar three times daily.    glipiZIDE (GLUCOTROL) 10 mg, oral, 2 times daily before meals    Lantus Solostar U-100 Insulin 40 Units, subcutaneous, 2 times daily, Take as directed per insulin instructions.    lisinopril 20 mg, oral, Daily    metFORMIN (GLUCOPHAGE) 1,000 mg, oral, 2 times daily    semaglutide 0.25 mg, subcutaneous, Weekly       Patient Active Problem List    Diagnosis Date Noted    Benign essential HTN 04/28/2023    Gout 04/28/2023    Pure hypercholesterolemia 04/28/2023    Type 2 diabetes mellitus without complication, with long-term current use of insulin 04/28/2023    Impingement syndrome of shoulder region 03/04/2025    Tobacco use 06/29/2023    Presbyopia 06/28/2023    Class 1 obesity due to excess calories with serious comorbidity and body mass index (BMI) of 33.0 to 33.9 in adult 05/08/2023    Hypogonadism in male 05/08/2023    Diabetic neuropathy (Multi) 04/28/2023    DJD " "of AC (acromioclavicular) joint 04/28/2023    Epidermal inclusion cyst 04/28/2023    Impingement syndrome of left shoulder 04/28/2023         Objective      Visit Vitals    Visit Vitals  /84   Pulse 82   Temp 36.7 °C (98 °F)   Resp 18   Ht 1.88 m (6' 2\")   Wt 110 kg (242 lb)   BMI 31.07 kg/m²   Smoking Status Every Day   BSA 2.4 m²       Body mass index is 31.07 kg/m².     Physical Exam     General - Not in acute distress and cooperative.  Build & Nutrition - Well developed  Posture - Normal  Gait - Normal  Mental Status - alert and oriented x 3    Head - Normocephalic    Neck - Thyroid normal size    Eyes - Bilateral - Sclera clear and lids pink without edema or mass.      Skin - Warm and dry with no rashes on visible skin    Lungs - Clear to auscultation and normal breathing effort    Cardiovascular - RRR and no murmurs, rubs or thrill.    Peripheral Vascular - Bilateral - no edema present    Neuropsychiatric - normal mood and affect        Assessment & Plan  Type 2 diabetes mellitus without complication, with long-term current use of insulin  His A1c has improved but it is still very high at 10.1.  The addition of semaglutide will commence soon and I think this will definitely help lower his A1c.  He will continue with the same Lantus and glipizide and metformin dose for now and we will follow-up in 3 months to recheck his A1c.  We again discussed how to decrease his carbohydrate intake especially regarding complex carbohydrates.    Orders:    POCT glycosylated hemoglobin (Hb A1C) manually resulted    Benign essential HTN  Condition seems well controlled.  No change in current treatment.                  Orders Placed This Encounter   Procedures    POCT glycosylated hemoglobin (Hb A1C) manually resulted        No orders of the defined types were placed in this encounter.         "

## 2025-07-01 NOTE — ASSESSMENT & PLAN NOTE
His A1c has improved but it is still very high at 10.1.  The addition of semaglutide will commence soon and I think this will definitely help lower his A1c.  He will continue with the same Lantus and glipizide and metformin dose for now and we will follow-up in 3 months to recheck his A1c.  We again discussed how to decrease his carbohydrate intake especially regarding complex carbohydrates.    Orders:    POCT glycosylated hemoglobin (Hb A1C) manually resulted

## 2025-07-25 DIAGNOSIS — E11.9 TYPE 2 DIABETES MELLITUS WITHOUT COMPLICATION, WITH LONG-TERM CURRENT USE OF INSULIN: ICD-10-CM

## 2025-07-25 DIAGNOSIS — Z79.4 TYPE 2 DIABETES MELLITUS WITHOUT COMPLICATION, WITH LONG-TERM CURRENT USE OF INSULIN: ICD-10-CM

## 2025-07-25 DIAGNOSIS — E78.00 PURE HYPERCHOLESTEROLEMIA: ICD-10-CM

## 2025-07-28 RX ORDER — GLIPIZIDE 10 MG/1
10 TABLET ORAL
Qty: 180 TABLET | Refills: 1 | Status: SHIPPED | OUTPATIENT
Start: 2025-07-28

## 2025-07-28 RX ORDER — ATORVASTATIN CALCIUM 40 MG/1
40 TABLET, FILM COATED ORAL DAILY
Qty: 90 TABLET | Refills: 1 | Status: SHIPPED | OUTPATIENT
Start: 2025-07-28

## 2025-08-04 ENCOUNTER — APPOINTMENT (OUTPATIENT)
Dept: PHARMACY | Facility: HOSPITAL | Age: 60
End: 2025-08-04
Payer: COMMERCIAL

## 2025-08-04 DIAGNOSIS — Z79.4 TYPE 2 DIABETES MELLITUS WITHOUT COMPLICATION, WITH LONG-TERM CURRENT USE OF INSULIN: Primary | ICD-10-CM

## 2025-08-04 DIAGNOSIS — E11.9 TYPE 2 DIABETES MELLITUS WITHOUT COMPLICATION, WITH LONG-TERM CURRENT USE OF INSULIN: Primary | ICD-10-CM

## 2025-08-04 NOTE — PROGRESS NOTES
"      Patient ID: Maxime Marroquin is a 60 y.o. male who presents for Diabetes.  Pt is here for Follow Up appointment.     PCP/Referring Provider: Ricardo Ramsey MD  Last Visit: 3.4.25    Verbal consent to manage patient's drug therapy was obtained from patient. They were informed they may decline to participate or withdraw from participation in pharmacy services at any time.      Subjective   Treatment Adherence:   Patient did take medications today.   Number of missed doses in last 7 days: 0.   Can patient afford prescribed medications: No, expresses cost concerns with insulin    Preferred pharmacy:     [facility], [place] - [address]  [address] [facility] [facility] [address]  Phone: [phone] Fax: [phone]    [facility] - [place], [place] - [address] [address]  Phone: [phone] Fax: [phone]    [facility]  [address], [address]  [facility] [address]  Phone: [phone] Fax: [phone]    Interval History:  Tresiba $99/month with copay card for brand - applies to deductible (currently costs \"off the charts\")  Lantus would be $35/month but does not apply to deductible  Lispro would be $35/month - not sure about deductible  Ozempic required PA, approved - waiting for copay info    HPI  DIABETES MELLITUS TYPE 2:    Known diabetic complications: neuropathy.  Does patient follow with Endocrinology?: No     Last optometry exam: > 1 year ago, to be scheduled   Most recent visit in Podiatry: none     Current diabetic medications include:  Lantus 40 units twice daily   Glipizide 10mg twice daily  Metformin IR 1000mg twice daily     Adverse Effects: none reported     Home blood glucose records (mg/dL) - every other day  FBG: not available    Any episodes of hypoglycemia? No, denies.    Did patient treat episode of hypoglycemia appropriately? N/A  Does the patient have a prescription for ready-to-use Glucagon? 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 N/A  Statin? No  ACE-I/ARB? Yes  UACR/EGFR in last year?: No  Albumin/Creatine Ratio   Date Value Ref Range Status   10/28/2021 55.6 (H) 0.0 - 30.0 ug/mg crt Final   02/11/2020 40.3 (H) 0.0 - 30.0 ug/mg crt Final       Pertinent PMH Review:  PMH of Pancreatitis: No  PMH of Retinopathy: No  PMH of Urinary Tract Infections: No  PMH of MTC: No  PMH of MEN2: No    Review of Systems    Objective     BP Readings from Last 4 Encounters:   07/01/25 136/84   03/04/25 136/88   07/26/24 128/82   04/11/24 130/78      There were no vitals filed for this visit.     Labs  Creatinine   Date Value Ref Range Status   04/10/2024 0.80 0.50 - 1.30 mg/dL Final     eGFR   Date Value Ref Range Status   04/10/2024 >90 >60 mL/min/1.73m*2 Final     Comment:     Calculations of estimated GFR are performed using the 2021 CKD-EPI Study Refit equation without the race variable for the IDMS-Traceable creatinine methods.  https://jasn.asnjournals.org/content/early/2021/09/22/ASN.8114439580     Sodium   Date Value Ref Range Status   04/10/2024 135 (L) 136 - 145 mmol/L Final     Potassium   Date Value Ref Range Status   04/10/2024 4.5 3.5 - 5.3 mmol/L Final     Chloride   Date Value Ref Range Status   04/10/2024 103 98 - 107 mmol/L Final     Urea Nitrogen   Date Value Ref Range Status   04/10/2024 12 6 - 23 mg/dL Final     AST   Date Value Ref Range Status   04/10/2024 9 9 - 39 U/L Final        Lab Results   Component Value Date    BILITOT 0.6 04/10/2024    CALCIUM 9.2 04/10/2024    CO2 25 04/10/2024    CL  "103 04/10/2024    CREATININE 0.80 04/10/2024    GLUCOSE 286 (H) 04/10/2024    ALKPHOS 80 04/10/2024    K 4.5 04/10/2024    PROT 6.6 04/10/2024     (L) 04/10/2024    AST 9 04/10/2024    ALT 13 04/10/2024    BUN 12 04/10/2024    ANIONGAP 12 04/10/2024    ALBUMIN 4.5 04/10/2024    GFRMALE >90 03/31/2023     Lab Results   Component Value Date    TRIG 104 04/10/2024    CHOL 103 04/10/2024    LDLCALC 52 04/10/2024    HDL 30.3 04/10/2024     Lab Results   Component Value Date    HGBA1C 10.1 (A) 07/01/2025     Albumin/Creatine Ratio   Date Value Ref Range Status   10/28/2021 55.6 (H) 0.0 - 30.0 ug/mg crt Final   02/11/2020 40.3 (H) 0.0 - 30.0 ug/mg crt Final       Current Outpatient Medications   Medication Instructions    atorvastatin (LIPITOR) 40 mg, oral, Daily    diclofenac (Voltaren) 50 mg EC tablet 1 tablet, 3 times daily (0900,1400,1900)    Easy Touch 31 gauge x 3/16\" needle USE DAILY AS DIRECTED    FreeStyle Lancets 28 gauge Use to test blood sugar three times daily.    glipiZIDE (GLUCOTROL) 10 mg, oral, 2 times daily before meals    Lantus Solostar U-100 Insulin 40 Units, subcutaneous, 2 times daily, Take as directed per insulin instructions.    lisinopril 20 mg, oral, Daily    metFORMIN (GLUCOPHAGE) 1,000 mg, oral, 2 times daily    semaglutide 0.25 mg, subcutaneous, Weekly        DRUG INTERACTIONS:  None requiring intervention at this time    Assessment/Plan   Problem List Items Addressed This Visit           ICD-10-CM    Type 2 diabetes mellitus without complication, with long-term current use of insulin - Primary E11.9, Z79.4    Is pt A1c at goal? No, 10%  Sugars are not available today. A1c is much improved. Instructed patient to check daily and have readings available at next appt. Likely will increase Lantus at next appt.    Not interested in Ozempic today. I believe this would be a good choice for BG control as well as weight loss benefit and cardiorenal protection. If a copay card can be used, this " would help bring down deductible.    Patient has commercial insurance which disqualifies him from  assistance programs. Because insurance isn't paying towards medications (5500 deductible), cannot use  patient assistance.       Medication Changes:  CONTINUE:  Glipizide 10mg twice daily  Metformin IR 1000mg twice daily   Lantus 40units twice daily     PATIENT EDUCATION/GOALS  Average < 150mg/dL  Time in range > 70%  Fasting B - 130 mg/dL  Postprandial BG: less than 180 mg/dL  A1c: less than 7%    Low and High Blood Sugar  Symptoms of low blood sugar include: Fast heartbeat, shaking, sweating, nervousness or anxiety, irritability or confusion, and/or dizziness.  Symptoms of high blood sugar include: Feeling more thirsty than usual, urinating often, losing weight without trying, presence of ketones in the urine, feeling tired and weak, feeling irritable or having other mood changes, having blurry vision, and/or having slow-healing sores.  If you experience symptoms of low blood sugar (blood sugar less than 70 mg/dL) follow the rule of 15 by eating ~15 g of simple carbohydrates (examples: half cup juice, 3-4 glucose tabs, 1 tablespoon of sugar, honey, or syrup).    Dietary Recommendations  The a lower carbohydrate or Mediterranean diet is often recommended for patients with elevated blood glucose.   Food recommendations:   Focus on whole foods, with as few ingredients as possible.   Focus on lower glycemic foods (GI of 55 or less): this includes most fruits and vegetables, beans, minimally processed grains, dairy, nuts and seeds.  Minimize moderate glycemic index (GI 56 to 69) foods: White and sweet potatoes, corn, white rice, couscous, breakfast cereals such as Cream of Wheat.  Avoid high glycemic index (GI of 70 or higher): White bread, rice cakes, most crackers, bagels, cakes, doughnuts, croissants, most packaged breakfast cereals.  Include 1-2 servings weekly fatty fish that are low in mercury  such as salmon, mackerel, anchovies, sardines, and herring. Avoid frying fish. Bake, steam, or poach.   Avoid trans-fats (fried foods, microwave popcorn, margarine, etc.).   Use oils such as coconut oil, olive oil, avocado oil, or ghee. Select oils appropriate for the temperature you are cooking at.   Avoid processed meats (such as deli meat), canned soups, soy sauce, and fried foods these are all high in added sodium.   The recommended sodium intake for most people is around 2,300 mg/day (too little or too much salt can affect blood pressure).   It is ok to add salt to suit your taste to fresh whole foods (such as vegetables) that you are cooking at home.   Include 4-5 servings daily of both fruits and vegetables. Fruits and vegetables are a good source of fiber, potassium, and magnesium which help support healthy blood pressure.  Focus on eating a variety of colors each day (eating the rainbow- such as red peppers, orange carrots, yellow beans, green lettuce, blue/purple berries, white/brown onions).   Avoid foods with added sugars (goal of <10 grams/serving of added sugar). Limit added sugars to less than 24 (women)-36 (men) grams daily.  Beverage recommendations:    Avoid caffeinated drinks such as coffee, energy drinks, and soda (both regular and diet). Consider sparkling water, water with lemon (or other fruit), or black, oolong, or green tea (prior to noon).   Avoid regular consumption of alcohol. If it is a special occasion the recommended alcohol intake for a male is 2 (men) or 1(women) or less drinks per day.  Alcohol consumption may place people with diabetes at increased risk for delayed hypoglycemia (low blood sugar) especially if taking other medications that may cause hypoglycemia such as insulin.     Lifestyle Recommendations  Avoid tobacco products (including chewing tobacco and vaping).   Continue to integrate regular movement and enjoyable forms of exercise into your weekly routine. The  recommended exercise regimen is 150 minutes per week (for example 5 days per week, 30 minutes per day).   Consider walking for 10-15 minutes after each meal in order to help control blood sugar.   Manage/reduce stress  Consider therapy, mindfulness, breathing exercises (4-7-8 breath), meditation, yoga, journaling, addressing/removing stressors, etc.   Sleep  Goal of 7-9 hours of restful sleep nightly.      Insulin Education:  Counseled patient on Lantus MOA, expectations, side effects, duration of therapy, administration, storage and monitoring parameters.  Medication should be taken at the same time of day each day  Insulin can be injected under the skin to the stomach, thigh or upper arm. Rotate sites with each injection.   New needle/syringe should be utilized with each injection  Addressed all of patients questions and concerns at time of appointment. Encouraged to reach out to PharmD with additional needs.           Relevant Orders    Referral to Clinical Pharmacy           Health Maintenance Due   Topic Date Due    Yearly Adult Physical  Never done    HIV Screening  Never done    MMR Vaccines (1 of 1 - Standard series) Never done    Hepatitis C Screening  Never done    DTaP/Tdap/Td Vaccines (1 - Tdap) 09/02/2000    Zoster Vaccines (1 of 2) Never done    Pneumococcal Vaccine (2 of 2 - PCV) 10/06/2016    Diabetes: Urine Protein Screening  10/28/2022    Diabetes: Retinopathy Screening  04/26/2023    PSA Prostate Cancer Screening  11/01/2023    RSV High Risk: (Elderly (60+) or Pregnant Population) (1 - Risk 60-74 years 1-dose series) Never done    COVID-19 Vaccine (2 - 2024-25 season) 09/01/2024    Lipid Panel  04/10/2025    Influenza Vaccine (1) 09/01/2025    Diabetes: Hemoglobin A1C  10/01/2025       Labs ordered:  none   Referrals:  none     Follow-up: 3-4 weeks     Patient was provided with PharmD phone number and encouraged to reach out with any questions or concerns Prior to next appointment or ask provider  for another pharmacy referral.    Time spent with pt: Total length of time 10 (minutes) of the encounter and more than 50% was spent counseling the patient.  Patient is NOT followed by CCM.     Thank you for allowing to take part in the care of this patient.    Daiana Thomas, PharmD, [facility]  Clinical Pharmacist  [phone]    Continue all meds under the continuation of care with the referring provider and clinical pharmacy team.    Verbal consent to manage patient's drug therapy was obtained from the patient. They were informed they may decline to participate or withdraw from participation in pharmacy services at any time.

## 2025-08-04 NOTE — ASSESSMENT & PLAN NOTE
Is pt A1c at goal? No, 10%  Sugars are not available today. A1c is much improved. Instructed patient to check daily and have readings available at next appt. Likely will increase Lantus at next appt.    Not interested in Ozempic today. I believe this would be a good choice for BG control as well as weight loss benefit and cardiorenal protection. If a copay card can be used, this would help bring down deductible.    Patient has commercial insurance which disqualifies him from  assistance programs. Because insurance isn't paying towards medications (5500 deductible), cannot use  patient assistance.       Medication Changes:  CONTINUE:  Glipizide 10mg twice daily  Metformin IR 1000mg twice daily   Lantus 40units twice daily     PATIENT EDUCATION/GOALS  Average < 150mg/dL  Time in range > 70%  Fasting B - 130 mg/dL  Postprandial BG: less than 180 mg/dL  A1c: less than 7%    Low and High Blood Sugar  Symptoms of low blood sugar include: Fast heartbeat, shaking, sweating, nervousness or anxiety, irritability or confusion, and/or dizziness.  Symptoms of high blood sugar include: Feeling more thirsty than usual, urinating often, losing weight without trying, presence of ketones in the urine, feeling tired and weak, feeling irritable or having other mood changes, having blurry vision, and/or having slow-healing sores.  If you experience symptoms of low blood sugar (blood sugar less than 70 mg/dL) follow the rule of 15 by eating ~15 g of simple carbohydrates (examples: half cup juice, 3-4 glucose tabs, 1 tablespoon of sugar, honey, or syrup).    Dietary Recommendations  The a lower carbohydrate or Mediterranean diet is often recommended for patients with elevated blood glucose.   Food recommendations:   Focus on whole foods, with as few ingredients as possible.   Focus on lower glycemic foods (GI of 55 or less): this includes most fruits and vegetables, beans, minimally processed grains, dairy, nuts and  seeds.  Minimize moderate glycemic index (GI 56 to 69) foods: White and sweet potatoes, corn, white rice, couscous, breakfast cereals such as Cream of Wheat.  Avoid high glycemic index (GI of 70 or higher): White bread, rice cakes, most crackers, bagels, cakes, doughnuts, croissants, most packaged breakfast cereals.  Include 1-2 servings weekly fatty fish that are low in mercury such as salmon, mackerel, anchovies, sardines, and herring. Avoid frying fish. Bake, steam, or poach.   Avoid trans-fats (fried foods, microwave popcorn, margarine, etc.).   Use oils such as coconut oil, olive oil, avocado oil, or ghee. Select oils appropriate for the temperature you are cooking at.   Avoid processed meats (such as deli meat), canned soups, soy sauce, and fried foods these are all high in added sodium.   The recommended sodium intake for most people is around 2,300 mg/day (too little or too much salt can affect blood pressure).   It is ok to add salt to suit your taste to fresh whole foods (such as vegetables) that you are cooking at home.   Include 4-5 servings daily of both fruits and vegetables. Fruits and vegetables are a good source of fiber, potassium, and magnesium which help support healthy blood pressure.  Focus on eating a variety of colors each day (eating the rainbow- such as red peppers, orange carrots, yellow beans, green lettuce, blue/purple berries, white/brown onions).   Avoid foods with added sugars (goal of <10 grams/serving of added sugar). Limit added sugars to less than 24 (women)-36 (men) grams daily.  Beverage recommendations:    Avoid caffeinated drinks such as coffee, energy drinks, and soda (both regular and diet). Consider sparkling water, water with lemon (or other fruit), or black, oolong, or green tea (prior to noon).   Avoid regular consumption of alcohol. If it is a special occasion the recommended alcohol intake for a male is 2 (men) or 1(women) or less drinks per day.  Alcohol consumption  may place people with diabetes at increased risk for delayed hypoglycemia (low blood sugar) especially if taking other medications that may cause hypoglycemia such as insulin.     Lifestyle Recommendations  Avoid tobacco products (including chewing tobacco and vaping).   Continue to integrate regular movement and enjoyable forms of exercise into your weekly routine. The recommended exercise regimen is 150 minutes per week (for example 5 days per week, 30 minutes per day).   Consider walking for 10-15 minutes after each meal in order to help control blood sugar.   Manage/reduce stress  Consider therapy, mindfulness, breathing exercises (4-7-8 breath), meditation, yoga, journaling, addressing/removing stressors, etc.   Sleep  Goal of 7-9 hours of restful sleep nightly.      Insulin Education:  Counseled patient on Lantus MOA, expectations, side effects, duration of therapy, administration, storage and monitoring parameters.  Medication should be taken at the same time of day each day  Insulin can be injected under the skin to the stomach, thigh or upper arm. Rotate sites with each injection.   New needle/syringe should be utilized with each injection  Addressed all of patients questions and concerns at time of appointment. Encouraged to reach out to PharmD with additional needs.

## 2025-09-08 ENCOUNTER — APPOINTMENT (OUTPATIENT)
Dept: PHARMACY | Facility: HOSPITAL | Age: 60
End: 2025-09-08
Payer: COMMERCIAL

## 2025-10-03 ENCOUNTER — APPOINTMENT (OUTPATIENT)
Dept: PRIMARY CARE | Facility: CLINIC | Age: 60
End: 2025-10-03
Payer: COMMERCIAL

## (undated) DEVICE — SUTURE PERMA-HAND SZ 2-0 L30IN NONABSORBABLE BLK L26MM SH K833H

## (undated) DEVICE — PENCIL SMOKE EVAC PUSH BUTTON COATED

## (undated) DEVICE — NEEDLE HYPO 25GA L1.5IN BLU POLYPR HUB S STL REG BVL STR

## (undated) DEVICE — SYRINGE MED 10ML LUERLOCK TIP W/O SFTY DISP

## (undated) DEVICE — ELECTRODE PT RET AD L9FT HI MOIST COND ADH HYDRGEL CORDED

## (undated) DEVICE — SPONGE GZ W4XL4IN RAYON POLY FILL CVR W/ NONWOVEN FAB

## (undated) DEVICE — COUNTER NDL 40 COUNT HLD 70 FOAM BLK ADH W/ MAG

## (undated) DEVICE — SYSTEM RECHARGING NEUROSTIMULATOR RECHRG BTTRY PK PWR SUPL

## (undated) DEVICE — SUTURE VCRL + SZ 3-0 L36IN ABSRB UD L36MM CT-1 1/2 CIR VCP944H

## (undated) DEVICE — APPLICATOR MEDICATED 26 CC SOLUTION HI LT ORNG CHLORAPREP

## (undated) DEVICE — SUTURE VCRL SZ 4-0 L18IN ABSRB UD L19MM PS-2 3/8 CIR PRIM J496H

## (undated) DEVICE — E-Z CLEAN, NON-STICK, PTFE COATED, ELECTROSURGICAL BLADE ELECTRODE, MODIFIED EXTENDED INSULATION, 2.5 INCH (6.35 CM): Brand: MEGADYNE

## (undated) DEVICE — SUTURE VCRL SZ 3-0 L27IN ABSRB UD L26MM SH 1/2 CIR J416H

## (undated) DEVICE — SYRINGE IRRIG 60ML SFT PLIABLE BLB EZ TO GRP 1 HND USE W/

## (undated) DEVICE — COVER LT HNDL BLU PLAS

## (undated) DEVICE — 3M™ TEGADERM™ TRANSPARENT FILM DRESSING FRAME STYLE, 1626W, 4 IN X 4-3/4 IN (10 CM X 12 CM), 50/CT 4CT/CASE: Brand: 3M™ TEGADERM™

## (undated) DEVICE — GAUZE,SPONGE,4"X4",16PLY,XRAY,STRL,LF: Brand: MEDLINE

## (undated) DEVICE — 3M™ IOBAN™ 2 ANTIMICROBIAL INCISE DRAPE 6650EZ: Brand: IOBAN™ 2

## (undated) DEVICE — MARKER SURG SKIN GENTIAN VLT REG TIP W/ 6IN RUL

## (undated) DEVICE — 1010 S-DRAPE TOWEL DRAPE 10/BX: Brand: STERI-DRAPE™

## (undated) DEVICE — CONTROLLER NEUROSTIMULATOR HND HELD PRGMR WIRELESS PTM FOR

## (undated) DEVICE — GOWN,AURORA,NONREINFORCED,LARGE: Brand: MEDLINE

## (undated) DEVICE — TOWEL,OR,DSP,ST,BLUE,STD,4/PK,20PK/CS: Brand: MEDLINE

## (undated) DEVICE — PACK,LAPAROTOMY,NO GOWNS: Brand: MEDLINE

## (undated) DEVICE — SHEET,DRAPE,53X77,STERILE: Brand: MEDLINE

## (undated) DEVICE — GLOVE ORANGE PI 7 1/2   MSG9075

## (undated) DEVICE — INTENDED FOR TISSUE SEPARATION, AND OTHER PROCEDURES THAT REQUIRE A SHARP SURGICAL BLADE TO PUNCTURE OR CUT.: Brand: BARD-PARKER ® CARBON RIB-BACK BLADES